# Patient Record
Sex: FEMALE | Race: BLACK OR AFRICAN AMERICAN | NOT HISPANIC OR LATINO
[De-identification: names, ages, dates, MRNs, and addresses within clinical notes are randomized per-mention and may not be internally consistent; named-entity substitution may affect disease eponyms.]

---

## 2023-06-08 PROBLEM — Z00.00 ENCOUNTER FOR PREVENTIVE HEALTH EXAMINATION: Status: ACTIVE | Noted: 2023-06-08

## 2023-06-13 ENCOUNTER — NON-APPOINTMENT (OUTPATIENT)
Age: 38
End: 2023-06-13

## 2023-06-13 ENCOUNTER — APPOINTMENT (OUTPATIENT)
Dept: OBGYN | Facility: CLINIC | Age: 38
End: 2023-06-13
Payer: MEDICAID

## 2023-06-13 VITALS
DIASTOLIC BLOOD PRESSURE: 70 MMHG | WEIGHT: 143 LBS | BODY MASS INDEX: 23.82 KG/M2 | HEIGHT: 65 IN | SYSTOLIC BLOOD PRESSURE: 118 MMHG

## 2023-06-13 PROCEDURE — 0500F INITIAL PRENATAL CARE VISIT: CPT

## 2023-06-13 RX ORDER — ONDANSETRON 8 MG/1
8 TABLET, ORALLY DISINTEGRATING ORAL EVERY 8 HOURS
Qty: 1 | Refills: 6 | Status: ACTIVE | COMMUNITY
Start: 2023-06-13 | End: 1900-01-01

## 2023-06-13 RX ORDER — TERCONAZOLE 80 MG/1
80 SUPPOSITORY VAGINAL
Qty: 3 | Refills: 0 | Status: COMPLETED | COMMUNITY
Start: 2023-06-13

## 2023-06-14 LAB
ABO + RH PNL BLD: NORMAL
ALBUMIN SERPL ELPH-MCNC: 4.8 G/DL
ALP BLD-CCNC: 52 U/L
ALT SERPL-CCNC: 14 U/L
ANION GAP SERPL CALC-SCNC: 13 MMOL/L
AST SERPL-CCNC: 29 U/L
BILIRUB SERPL-MCNC: 0.4 MG/DL
BLD GP AB SCN SERPL QL: NORMAL
BUN SERPL-MCNC: 8 MG/DL
CALCIUM SERPL-MCNC: 10.5 MG/DL
CANDIDA VAG CYTO: DETECTED
CHLORIDE SERPL-SCNC: 101 MMOL/L
CO2 SERPL-SCNC: 21 MMOL/L
CREAT SERPL-MCNC: 0.6 MG/DL
EGFR: 117 ML/MIN/1.73M2
ESTIMATED AVERAGE GLUCOSE: 105 MG/DL
G VAGINALIS+PREV SP MTYP VAG QL MICRO: NOT DETECTED
GLUCOSE SERPL-MCNC: 89 MG/DL
HBA1C MFR BLD HPLC: 5.3 %
HBV SURFACE AG SER QL: NONREACTIVE
HCV AB SER QL: NONREACTIVE
HCV S/CO RATIO: 0.15 S/CO
HGB A MFR BLD: 97.1 %
HGB A2 MFR BLD: 2.9 %
HGB FRACT BLD-IMP: NORMAL
POTASSIUM SERPL-SCNC: 4.2 MMOL/L
PROT SERPL-MCNC: 7.8 G/DL
SODIUM SERPL-SCNC: 136 MMOL/L
T VAGINALIS VAG QL WET PREP: NOT DETECTED
TSH SERPL-ACNC: 1.67 UIU/ML

## 2023-06-14 RX ORDER — MICONAZOLE NITRATE 20 MG/G
2 CREAM VAGINAL
Qty: 1 | Refills: 0 | Status: ACTIVE | COMMUNITY
Start: 2023-06-14 | End: 1900-01-01

## 2023-06-15 LAB
BACTERIA UR CULT: NORMAL
C TRACH RRNA SPEC QL NAA+PROBE: NOT DETECTED
CMV IGG SERPL QL: 6.3 U/ML
CMV IGG SERPL-IMP: POSITIVE
CMV IGM SERPL QL: <8 AU/ML
CMV IGM SERPL QL: NEGATIVE
HIV1+2 AB SPEC QL IA.RAPID: NONREACTIVE
LEAD BLD-MCNC: <1 UG/DL
MEV IGG FLD QL IA: >300 AU/ML
MEV IGG+IGM SER-IMP: POSITIVE
MUV AB SER-ACNC: POSITIVE
MUV IGG SER QL IA: 117 AU/ML
N GONORRHOEA RRNA SPEC QL NAA+PROBE: NOT DETECTED
RUBV IGG FLD-ACNC: 24.4 INDEX
RUBV IGG SER-IMP: POSITIVE
SOURCE AMPLIFICATION: NORMAL
T GONDII AB SER-IMP: NEGATIVE
T GONDII AB SER-IMP: NEGATIVE
T GONDII IGG SER QL: <3 IU/ML
T GONDII IGM SER QL: <3 AU/ML
T PALLIDUM AB SER QL IA: NEGATIVE
VZV AB TITR SER: POSITIVE
VZV IGG SER IF-ACNC: 487 INDEX

## 2023-06-19 LAB
B19V IGG SER QL IA: 6.85 INDEX
B19V IGG+IGM SER-IMP: NORMAL
B19V IGG+IGM SER-IMP: POSITIVE
B19V IGM FLD-ACNC: 0.3 INDEX
B19V IGM SER-ACNC: NEGATIVE
FMR1 GENE MUT ANL BLD/T: NORMAL

## 2023-06-20 LAB
AR GENE MUT ANL BLD/T: NORMAL
CFTR MUT TESTED BLD/T: NEGATIVE

## 2023-06-20 NOTE — HISTORY OF PRESENT ILLNESS
[Patient reported mammogram was normal] : Patient reported mammogram was normal [Patient reported PAP Smear was normal] : Patient reported PAP Smear was normal [N] : Patient does not use contraception [Monogamous (Male Partner)] : is monogamous with a male partner [Y] : Positive pregnancy history [Normal Amount/Duration] :  normal amount and duration [Regular Cycle Intervals] : periods have been regular [Currently Active] : currently active [Men] : men [No] : No [Mammogramdate] : 2023 [TextBox_19] : Patient reports normal [PapSmeardate] : 2022 [TextBox_31] : Patient reports normal [LMPDate] : 4/9/23 [MensesFreq] : 28 [MensesLength] : 4-5 [PGHxTotal] : 2 [PGHxFullTerm] : 0 [PGHxPremature] : 0 [PGHxAbortions] : 1 [[facility] ServicesxLiving] : 0 [PGHxABInduced] : 0 [PGHxABSpont] : 0 [PGHxEctopic] : 0 [PGHxMultBirths] : 0 [FreeTextEntry1] : 4/9/23

## 2023-06-20 NOTE — COUNSELING
[Nutrition/ Exercise/ Weight Management] : nutrition, exercise, weight management [Vitamins/Supplements] : vitamins/supplements [Breast Self Exam] : breast self exam [Vaccines] : vaccines [FreeTextEntry2] : diet restrictions

## 2023-06-20 NOTE — END OF VISIT
[] : Resident [FreeTextEntry3] : DIETARY CHANGES, EXERCISE MODIFICATIONS, PNV W/ DHA RECOMMENDED, TRAVEL RESTRICTIONS,COVID VACCINE DISCUSSED\par PRENATAL LABS\par URINE CULTURE\par VAGINAL CULTURES COLLECTED\par REFERRAL FOR MFM US PONCHO\par \par

## 2023-06-28 ENCOUNTER — ASOB RESULT (OUTPATIENT)
Age: 38
End: 2023-06-28

## 2023-06-28 ENCOUNTER — APPOINTMENT (OUTPATIENT)
Dept: ANTEPARTUM | Facility: CLINIC | Age: 38
End: 2023-06-28
Payer: MEDICAID

## 2023-06-28 PROCEDURE — 93976 VASCULAR STUDY: CPT

## 2023-06-28 PROCEDURE — 76801 OB US < 14 WKS SINGLE FETUS: CPT

## 2023-06-28 PROCEDURE — 76813 OB US NUCHAL MEAS 1 GEST: CPT

## 2023-07-03 ENCOUNTER — NON-APPOINTMENT (OUTPATIENT)
Age: 38
End: 2023-07-03

## 2023-07-10 ENCOUNTER — NON-APPOINTMENT (OUTPATIENT)
Age: 38
End: 2023-07-10

## 2023-07-10 ENCOUNTER — APPOINTMENT (OUTPATIENT)
Dept: OBGYN | Facility: CLINIC | Age: 38
End: 2023-07-10
Payer: MEDICAID

## 2023-07-10 VITALS
WEIGHT: 141 LBS | DIASTOLIC BLOOD PRESSURE: 72 MMHG | BODY MASS INDEX: 23.49 KG/M2 | SYSTOLIC BLOOD PRESSURE: 120 MMHG | HEIGHT: 65 IN | OXYGEN SATURATION: 100 %

## 2023-07-10 PROCEDURE — 0502F SUBSEQUENT PRENATAL CARE: CPT

## 2023-07-11 ENCOUNTER — NON-APPOINTMENT (OUTPATIENT)
Age: 38
End: 2023-07-11

## 2023-07-31 ENCOUNTER — APPOINTMENT (OUTPATIENT)
Dept: ANTEPARTUM | Facility: CLINIC | Age: 38
End: 2023-07-31
Payer: MEDICAID

## 2023-07-31 ENCOUNTER — ASOB RESULT (OUTPATIENT)
Age: 38
End: 2023-07-31

## 2023-07-31 PROCEDURE — 76805 OB US >/= 14 WKS SNGL FETUS: CPT

## 2023-08-01 ENCOUNTER — RX RENEWAL (OUTPATIENT)
Age: 38
End: 2023-08-01

## 2023-08-02 ENCOUNTER — NON-APPOINTMENT (OUTPATIENT)
Age: 38
End: 2023-08-02

## 2023-08-07 ENCOUNTER — NON-APPOINTMENT (OUTPATIENT)
Age: 38
End: 2023-08-07

## 2023-08-11 ENCOUNTER — APPOINTMENT (OUTPATIENT)
Dept: OBGYN | Facility: CLINIC | Age: 38
End: 2023-08-11
Payer: MEDICAID

## 2023-08-11 VITALS
WEIGHT: 150 LBS | SYSTOLIC BLOOD PRESSURE: 120 MMHG | DIASTOLIC BLOOD PRESSURE: 70 MMHG | BODY MASS INDEX: 24.96 KG/M2 | OXYGEN SATURATION: 100 %

## 2023-08-11 DIAGNOSIS — R00.0 TACHYCARDIA, UNSPECIFIED: ICD-10-CM

## 2023-08-11 PROCEDURE — 0502F SUBSEQUENT PRENATAL CARE: CPT

## 2023-08-11 PROCEDURE — 81003 URINALYSIS AUTO W/O SCOPE: CPT | Mod: NC,QW

## 2023-08-14 LAB
HCT VFR BLD CALC: 33.8 %
HGB BLD-MCNC: 10.9 G/DL
MCHC RBC-ENTMCNC: 31.6 PG
MCHC RBC-ENTMCNC: 32.2 GM/DL
MCV RBC AUTO: 98 FL
PLATELET # BLD AUTO: 270 K/UL
RBC # BLD: 3.45 M/UL
RBC # FLD: 15.9 %
WBC # FLD AUTO: 9.44 K/UL

## 2023-08-17 LAB
AFP MOM: 1.1
AFP VALUE: 55.5 NG/ML
ALPHA FETOPROTEIN SERUM COMMENT: NORMAL
ALPHA FETOPROTEIN SERUM INTERPRETATION: NORMAL
ALPHA FETOPROTEIN SERUM RESULTS: NORMAL
ALPHA FETOPROTEIN SERUM TEST RESULTS: NORMAL
GESTATIONAL AGE BASED ON: NORMAL
GESTATIONAL AGE ON COLLECTION DATE: 18.9 WEEKS
INSULIN DEP DIABETES: NO
MATERNAL AGE AT EDD AFP: 39.9 YR
MULTIPLE GESTATION: NO
OSBR RISK 1 IN: 8933
RACE: NORMAL
WEIGHT AFP: 150 LBS

## 2023-08-24 ENCOUNTER — EMERGENCY (EMERGENCY)
Facility: HOSPITAL | Age: 38
LOS: 1 days | Discharge: HOME | End: 2023-08-24
Attending: STUDENT IN AN ORGANIZED HEALTH CARE EDUCATION/TRAINING PROGRAM | Admitting: OBSTETRICS & GYNECOLOGY
Payer: COMMERCIAL

## 2023-08-24 VITALS
SYSTOLIC BLOOD PRESSURE: 127 MMHG | RESPIRATION RATE: 18 BRPM | HEART RATE: 124 BPM | OXYGEN SATURATION: 98 % | TEMPERATURE: 98 F | DIASTOLIC BLOOD PRESSURE: 80 MMHG

## 2023-08-24 VITALS — HEART RATE: 116 BPM

## 2023-08-24 DIAGNOSIS — O99.891 OTHER SPECIFIED DISEASES AND CONDITIONS COMPLICATING PREGNANCY: ICD-10-CM

## 2023-08-24 DIAGNOSIS — Z3A.20 20 WEEKS GESTATION OF PREGNANCY: ICD-10-CM

## 2023-08-24 DIAGNOSIS — R10.32 LEFT LOWER QUADRANT PAIN: ICD-10-CM

## 2023-08-24 DIAGNOSIS — R00.0 TACHYCARDIA, UNSPECIFIED: ICD-10-CM

## 2023-08-24 LAB
ALBUMIN SERPL ELPH-MCNC: 3.8 G/DL — SIGNIFICANT CHANGE UP (ref 3.3–5)
ALP SERPL-CCNC: 78 U/L — SIGNIFICANT CHANGE UP (ref 40–120)
ALT FLD-CCNC: 25 U/L — SIGNIFICANT CHANGE UP (ref 10–45)
ANION GAP SERPL CALC-SCNC: 8 MMOL/L — SIGNIFICANT CHANGE UP (ref 5–17)
APPEARANCE UR: CLEAR — SIGNIFICANT CHANGE UP
AST SERPL-CCNC: 35 U/L — SIGNIFICANT CHANGE UP (ref 10–40)
BILIRUB SERPL-MCNC: 0.7 MG/DL — SIGNIFICANT CHANGE UP (ref 0.2–1.2)
BILIRUB UR-MCNC: NEGATIVE — SIGNIFICANT CHANGE UP
BUN SERPL-MCNC: 6 MG/DL — LOW (ref 7–23)
CALCIUM SERPL-MCNC: 9.8 MG/DL — SIGNIFICANT CHANGE UP (ref 8.4–10.5)
CHLORIDE SERPL-SCNC: 101 MMOL/L — SIGNIFICANT CHANGE UP (ref 96–108)
CO2 SERPL-SCNC: 24 MMOL/L — SIGNIFICANT CHANGE UP (ref 22–31)
COLOR SPEC: YELLOW — SIGNIFICANT CHANGE UP
CREAT SERPL-MCNC: 0.71 MG/DL — SIGNIFICANT CHANGE UP (ref 0.5–1.3)
DIFF PNL FLD: NEGATIVE — SIGNIFICANT CHANGE UP
EGFR: 111 ML/MIN/1.73M2 — SIGNIFICANT CHANGE UP
GLUCOSE SERPL-MCNC: 133 MG/DL — HIGH (ref 70–99)
GLUCOSE UR QL: 100
HCT VFR BLD CALC: 35.7 % — SIGNIFICANT CHANGE UP (ref 34.5–45)
HGB BLD-MCNC: 12.3 G/DL — SIGNIFICANT CHANGE UP (ref 11.5–15.5)
KETONES UR-MCNC: NEGATIVE — SIGNIFICANT CHANGE UP
LEUKOCYTE ESTERASE UR-ACNC: NEGATIVE — SIGNIFICANT CHANGE UP
MCHC RBC-ENTMCNC: 32.9 PG — SIGNIFICANT CHANGE UP (ref 27–34)
MCHC RBC-ENTMCNC: 34.5 GM/DL — SIGNIFICANT CHANGE UP (ref 32–36)
MCV RBC AUTO: 95.5 FL — SIGNIFICANT CHANGE UP (ref 80–100)
NITRITE UR-MCNC: NEGATIVE — SIGNIFICANT CHANGE UP
NRBC # BLD: 0 /100 WBCS — SIGNIFICANT CHANGE UP (ref 0–0)
PH UR: 7 — SIGNIFICANT CHANGE UP (ref 5–8)
PLATELET # BLD AUTO: 254 K/UL — SIGNIFICANT CHANGE UP (ref 150–400)
POTASSIUM SERPL-MCNC: 3.3 MMOL/L — LOW (ref 3.5–5.3)
POTASSIUM SERPL-SCNC: 3.3 MMOL/L — LOW (ref 3.5–5.3)
PROT SERPL-MCNC: 7.4 G/DL — SIGNIFICANT CHANGE UP (ref 6–8.3)
PROT UR-MCNC: NEGATIVE MG/DL — SIGNIFICANT CHANGE UP
RBC # BLD: 3.74 M/UL — LOW (ref 3.8–5.2)
RBC # FLD: 14.3 % — SIGNIFICANT CHANGE UP (ref 10.3–14.5)
SODIUM SERPL-SCNC: 133 MMOL/L — LOW (ref 135–145)
SP GR SPEC: <=1.005 — SIGNIFICANT CHANGE UP (ref 1–1.03)
UROBILINOGEN FLD QL: 0.2 E.U./DL — SIGNIFICANT CHANGE UP
WBC # BLD: 11.56 K/UL — HIGH (ref 3.8–10.5)
WBC # FLD AUTO: 11.56 K/UL — HIGH (ref 3.8–10.5)

## 2023-08-24 PROCEDURE — 80053 COMPREHEN METABOLIC PANEL: CPT

## 2023-08-24 PROCEDURE — 81003 URINALYSIS AUTO W/O SCOPE: CPT

## 2023-08-24 PROCEDURE — 36415 COLL VENOUS BLD VENIPUNCTURE: CPT

## 2023-08-24 PROCEDURE — 99285 EMERGENCY DEPT VISIT HI MDM: CPT

## 2023-08-24 PROCEDURE — 85027 COMPLETE CBC AUTOMATED: CPT

## 2023-08-24 RX ORDER — SODIUM CHLORIDE 9 MG/ML
1000 INJECTION, SOLUTION INTRAVENOUS ONCE
Refills: 0 | Status: COMPLETED | OUTPATIENT
Start: 2023-08-24 | End: 2023-08-24

## 2023-08-24 RX ORDER — ACETAMINOPHEN 500 MG
650 TABLET ORAL ONCE
Refills: 0 | Status: COMPLETED | OUTPATIENT
Start: 2023-08-24 | End: 2023-08-24

## 2023-08-24 RX ADMIN — SODIUM CHLORIDE 1000 MILLILITER(S): 9 INJECTION, SOLUTION INTRAVENOUS at 20:26

## 2023-08-24 RX ADMIN — Medication 650 MILLIGRAM(S): at 20:55

## 2023-08-24 NOTE — ED PROVIDER NOTE - CLINICAL SUMMARY MEDICAL DECISION MAKING FREE TEXT BOX
Pt has LLQ abd and L flank pain, 2/2 likely pyelonephritis in pregnancy.  Low suspicion for atypical appendicitis, renal stone, genital torsion, acute cholecystitis, AAA, aortic dissection, serious bacterial illness or other emergent intraabdominal pathology.  Afebrile in ED, no s/s of sepsis  R/o infection with UA  Pt and provider decided not to obtain imaging in ED. Will transfer pt upstairs to L&D for further evaluation upon completion of ED w/u and treatment.  Labs, pain control, IVF, Abx Pt has LLQ abd and L flank pain, 2/2 possible pyelonephritis in pregnancy vs normal pregnancy pain vs other complication related to pregnancy.  Low suspicion for atypical appendicitis, renal stone, genital torsion, acute cholecystitis, AAA, aortic dissection, serious bacterial illness or other emergent intraabdominal pathology.  Afebrile in ED, no s/s of sepsis  R/o infection with UA  Pt and provider decided not to obtain imaging in ED. Will transfer pt upstairs to L&D for further evaluation upon completion of ED w/u and treatment.  Labs, pain control, IVF, +/- Abx pending UA Pt has LLQ abd and L flank pain, 2/2 possible pyelonephritis in pregnancy vs normal pregnancy pain vs other complication related to pregnancy.  Low suspicion for atypical appendicitis, renal stone, genital torsion, acute cholecystitis, AAA, aortic dissection, serious bacterial illness or other emergent intraabdominal pathology.  Afebrile in ED, Low suspicion of sepsis   EKG shows sinus tachycardia, patient visibly anxious. HR improving with IV fluids.  R/o infection with UA  Pt and provider decided not to obtain imaging in ED. Will transfer pt upstairs to L&D for further evaluation upon completion of ED w/u and treatment.  Labs, pain control, IVF, +/- Abx pending UA

## 2023-08-24 NOTE — ED PROVIDER NOTE - PROGRESS NOTE DETAILS
UA neg for infection. Mild leukocytosis noted (WNL for pregnancy). Tachycardia improving w IVF bolus. Low susp of emergent etiology of pain. Will send to L&D for further evaluation.

## 2023-08-24 NOTE — ED PROVIDER NOTE - NS ED ROS FT
CONSTITUTIONAL: No fever, no chills, no fatigue  EYES: No eye redness, no visual changes  ENT: No ear pain, no sore throat  CARDIOVASCULAR: No chest pain, no palpitations  RESPIRATORY: No cough, no SOB  GI: + abdominal pain, no nausea, no vomiting, no constipation, no diarrhea  GENITOURINARY: No dysuria, no hematuria, + flank pain  MUSCULOSKELETAL: No joint pain, no myalgias  SKIN: No rash, no peripheral edema  NEURO: No headache, no confusion    ALL OTHER SYSTEMS NEGATIVE.

## 2023-08-24 NOTE — ED PROVIDER NOTE - PHYSICAL EXAMINATION
CONSTITUTIONAL: Non-toxic; in no apparent distress  HEAD: Normocephalic; atraumatic  EYES: PERRL; EOM intact   ENMT: External appears normal  NECK: Supple; non-tender  CARD: Tachycardic; no murmurs, rubs, or gallops  RESP: Normal chest excursion with respiration; breath sounds clear and equal bilaterally  ABD: Soft, non-distended; non-tender  EXT: Normal ROM in all four extremities; non-tender to palpation  SKIN: Warm, dry, no rash  NEURO:  No focal neurological deficiencies.

## 2023-08-24 NOTE — ED ADULT NURSE NOTE - NSFALLUNIVINTERV_ED_ALL_ED
Bed/Stretcher in lowest position, wheels locked, appropriate side rails in place/Call bell, personal items and telephone in reach/Instruct patient to call for assistance before getting out of bed/chair/stretcher/Non-slip footwear applied when patient is off stretcher/[place] to call system/Physically safe environment - no spills, clutter or unnecessary equipment/Purposeful proactive rounding/Room/bathroom lighting operational, light cord in reach Bed/Stretcher in lowest position, wheels locked, appropriate side rails in place/Call bell, personal items and telephone in reach/Instruct patient to call for assistance before getting out of bed/chair/stretcher/Non-slip footwear applied when patient is off stretcher/[place] to call system/Physically safe environment - no spills, clutter or unnecessary equipment/Purposeful proactive rounding/Room/bathroom lighting operational, light cord in reach Bed/Stretcher in lowest position, wheels locked, appropriate side rails in place/Call bell, personal items and telephone in reach/Instruct patient to call for assistance before getting out of bed/chair/stretcher/Non-slip footwear applied when patient is off stretcher/[place] to call system/Physically safe environment - no spills, clutter or unnecessary equipment/Purposeful proactive rounding/Room/bathroom lighting operational, light cord in reach

## 2023-08-24 NOTE — ED PROVIDER NOTE - NSFOLLOWUPINSTRUCTIONS_ED_ALL_ED_FT
Follow up with your primary medical doctor as soon as possible.    Return to the emergency department if your symptoms worsen or if you develop new symptoms.  If you have any problems with followup, please call the ED Referral Coordinator at [phone].    Pyelonephritis    Pyelonephritis is a kidney infection. In most cases, the infection clears up with treatment and does not cause further problems. More severe infections or chronic infections can sometimes spread to the bloodstream or lead to other problems with the kidneys. Symptoms include frequent or painful urination, abdominal pain, back pain, flank pain, fever/chills, nausea, or vomiting. If you were prescribed an antibiotic medicine, take it as told by your health care provider. Do not stop taking the antibiotic even if you start to feel better.    SEEK IMMEDIATE MEDICAL CARE IF YOU HAVE ANY OF THE FOLLOWING SYMPTOMS: inability to hold down antibiotics or fluids, worsening pain, dizziness/lightheadedness, or change in mental status. Follow up with your primary medical doctor as soon as possible.    Return to the emergency department if your symptoms worsen or if you develop new symptoms.  If you have any problems with followup, please call the ED Referral Coordinator at [phone].    Pyelonephritis    Pyelonephritis is a kidney infection. In most cases, the infection clears up with treatment and does not cause further problems. More severe infections or chronic infections can sometimes spread to the bloodstream or lead to other problems with the kidneys. Symptoms include frequent or painful urination, abdominal pain, back pain, flank pain, fever/chills, nausea, or vomiting. If you were prescribed an antibiotic medicine, take it as told by your health care provider. Do not stop taking the antibiotic even if you start to feel better.    SEEK IMMEDIATE MEDICAL CARE IF YOU HAVE ANY OF THE FOLLOWING SYMPTOMS: inability to hold down antibiotics or fluids, worsening pain, dizziness/lightheadedness, or change in mental status. Follow up with your primary medical doctor as soon as possible.    Return to the emergency department if your symptoms worsen or if you develop new symptoms.  If you have any problems with followup, please call the ED Referral Coordinator at [phone].    Pyelonephritis    Pyelonephritis is a kidney infection. In most cases, the infection clears up with treatment and does not cause further problems. More severe infections or chronic infections can sometimes spread to the bloodstream or lead to other problems with the kidneys. Symptoms include frequent or painful urination, abdominal pain, back pain, flank pain, fever/chills, nausea, or vomiting. If you were prescribed an antibiotic medicine, take it as told by your health care provider. Do not stop taking the antibiotic even if you start to feel better.    SEEK IMMEDIATE MEDICAL CARE IF YOU HAVE ANY OF THE FOLLOWING SYMPTOMS: inability to hold down antibiotics or fluids, worsening pain, dizziness/lightheadedness, or change in mental status.

## 2023-08-24 NOTE — ED PROVIDER NOTE - OBJECTIVE STATEMENT
39 yo F , 20 wks pregnant, w no PMH, p/w LLQ abd pain since yesterday. She states she got up to urinate multiple times overnight, during which she noticed pain more. She also has mild L-sided flank pain for 1-2 days. No dysuria, vaginal dc or bleeding, n/v, diarrhea, or constipation. No SOB, CP, leg swelling, cough, hemoptysis, or recent travel. 37 yo F , 20 wks pregnant, w no PMH, p/w LLQ abd pain since yesterday. She states she got up to urinate multiple times overnight, during which she noticed pain more. She also has mild L-sided flank pain for 1-2 days. No dysuria, vaginal dc or bleeding, n/v, diarrhea, or constipation. No SOB, CP, leg swelling, cough, hemoptysis, or recent travel.

## 2023-08-24 NOTE — ED ADULT TRIAGE NOTE - CHIEF COMPLAINT QUOTE
Pt presents to ED C/O LLQ abd pain starting last night. Pt 20 weeks pregnant . States, " I feel when I move around the pain moves". Denies bleeding/ discharge, N/V/D.

## 2023-08-29 ENCOUNTER — ASOB RESULT (OUTPATIENT)
Age: 38
End: 2023-08-29

## 2023-08-29 ENCOUNTER — APPOINTMENT (OUTPATIENT)
Dept: ANTEPARTUM | Facility: CLINIC | Age: 38
End: 2023-08-29
Payer: MEDICAID

## 2023-08-29 PROCEDURE — 76811 OB US DETAILED SNGL FETUS: CPT

## 2023-09-01 ENCOUNTER — NON-APPOINTMENT (OUTPATIENT)
Age: 38
End: 2023-09-01

## 2023-09-08 ENCOUNTER — NON-APPOINTMENT (OUTPATIENT)
Age: 38
End: 2023-09-08

## 2023-09-08 ENCOUNTER — APPOINTMENT (OUTPATIENT)
Dept: OBGYN | Facility: CLINIC | Age: 38
End: 2023-09-08
Payer: MEDICAID

## 2023-09-08 VITALS
WEIGHT: 150 LBS | SYSTOLIC BLOOD PRESSURE: 120 MMHG | BODY MASS INDEX: 24.96 KG/M2 | OXYGEN SATURATION: 100 % | DIASTOLIC BLOOD PRESSURE: 80 MMHG

## 2023-09-08 DIAGNOSIS — N89.8 OTHER SPECIFIED NONINFLAMMATORY DISORDERS OF VAGINA: ICD-10-CM

## 2023-09-08 PROCEDURE — 0502F SUBSEQUENT PRENATAL CARE: CPT

## 2023-09-12 ENCOUNTER — ASOB RESULT (OUTPATIENT)
Age: 38
End: 2023-09-12

## 2023-09-12 ENCOUNTER — APPOINTMENT (OUTPATIENT)
Dept: ANTEPARTUM | Facility: CLINIC | Age: 38
End: 2023-09-12
Payer: MEDICAID

## 2023-09-12 PROCEDURE — 76816 OB US FOLLOW-UP PER FETUS: CPT

## 2023-09-15 ENCOUNTER — NON-APPOINTMENT (OUTPATIENT)
Age: 38
End: 2023-09-15

## 2023-09-15 DIAGNOSIS — B37.31 ACUTE CANDIDIASIS OF VULVA AND VAGINA: ICD-10-CM

## 2023-09-15 LAB
A VAGINAE DNA VAG QL NAA+PROBE: NORMAL
BVAB2 DNA VAG QL NAA+PROBE: NORMAL
C KRUSEI DNA VAG QL NAA+PROBE: NEGATIVE
C KRUSEI DNA VAG QL NAA+PROBE: POSITIVE
C TRACH RRNA SPEC QL NAA+PROBE: NEGATIVE
CANDIDA DNA VAG QL NAA+PROBE: NEGATIVE
MEGA1 DNA VAG QL NAA+PROBE: NORMAL
N GONORRHOEA RRNA SPEC QL NAA+PROBE: NEGATIVE
T VAGINALIS RRNA SPEC QL NAA+PROBE: NEGATIVE

## 2023-09-15 RX ORDER — TERCONAZOLE 4 MG/G
0.4 CREAM VAGINAL
Qty: 1 | Refills: 0 | Status: ACTIVE | COMMUNITY
Start: 2023-09-15 | End: 1900-01-01

## 2023-10-03 ENCOUNTER — NON-APPOINTMENT (OUTPATIENT)
Age: 38
End: 2023-10-03

## 2023-10-06 ENCOUNTER — APPOINTMENT (OUTPATIENT)
Dept: OBGYN | Facility: CLINIC | Age: 38
End: 2023-10-06
Payer: MEDICAID

## 2023-10-06 VITALS
OXYGEN SATURATION: 100 % | SYSTOLIC BLOOD PRESSURE: 100 MMHG | DIASTOLIC BLOOD PRESSURE: 70 MMHG | BODY MASS INDEX: 25.13 KG/M2 | WEIGHT: 151 LBS

## 2023-10-06 PROCEDURE — 0502F SUBSEQUENT PRENATAL CARE: CPT

## 2023-10-06 PROCEDURE — 81003 URINALYSIS AUTO W/O SCOPE: CPT | Mod: QW

## 2023-10-06 RX ORDER — FOLIC ACID/MULTIVIT,IRON,MINER 0.4MG-18MG
200 TABLET ORAL
Qty: 90 | Refills: 3 | Status: DISCONTINUED | COMMUNITY
Start: 2023-08-14 | End: 2023-10-06

## 2023-10-06 RX ORDER — PSEUDOEPHEDRINE HCL 30 MG
27-0.8 TABLET ORAL DAILY
Qty: 90 | Refills: 0 | Status: ACTIVE | COMMUNITY
Start: 2023-10-06 | End: 1900-01-01

## 2023-10-09 LAB
BACTERIA UR CULT: NORMAL
GLUCOSE 1H P 50 G GLC PO SERPL-MCNC: 97 MG/DL
HCT VFR BLD CALC: 35.1 %
HGB BLD-MCNC: 11 G/DL
MCHC RBC-ENTMCNC: 31.3 GM/DL
MCHC RBC-ENTMCNC: 32.7 PG
MCV RBC AUTO: 104.5 FL
PLATELET # BLD AUTO: 254 K/UL
RBC # BLD: 3.36 M/UL
RBC # FLD: 14.2 %
T PALLIDUM AB SER QL IA: NEGATIVE
WBC # FLD AUTO: 9.4 K/UL

## 2023-10-17 ENCOUNTER — EMERGENCY (EMERGENCY)
Facility: HOSPITAL | Age: 38
LOS: 1 days | Discharge: HOME | End: 2023-10-17
Attending: STUDENT IN AN ORGANIZED HEALTH CARE EDUCATION/TRAINING PROGRAM | Admitting: OBSTETRICS & GYNECOLOGY
Payer: COMMERCIAL

## 2023-10-17 VITALS
DIASTOLIC BLOOD PRESSURE: 69 MMHG | TEMPERATURE: 98 F | HEART RATE: 111 BPM | SYSTOLIC BLOOD PRESSURE: 117 MMHG | RESPIRATION RATE: 18 BRPM | WEIGHT: 152.12 LBS | HEIGHT: 66 IN | OXYGEN SATURATION: 100 %

## 2023-10-17 LAB
APPEARANCE UR: CLEAR — SIGNIFICANT CHANGE UP
BACTERIA # UR AUTO: PRESENT /HPF
BILIRUB UR-MCNC: NEGATIVE — SIGNIFICANT CHANGE UP
COLOR SPEC: YELLOW — SIGNIFICANT CHANGE UP
COMMENT - URINE: SIGNIFICANT CHANGE UP
DIFF PNL FLD: NEGATIVE — SIGNIFICANT CHANGE UP
EPI CELLS # UR: ABNORMAL /HPF (ref 0–5)
GLUCOSE UR QL: NEGATIVE — SIGNIFICANT CHANGE UP
KETONES UR-MCNC: NEGATIVE — SIGNIFICANT CHANGE UP
LEUKOCYTE ESTERASE UR-ACNC: ABNORMAL
NITRITE UR-MCNC: NEGATIVE — SIGNIFICANT CHANGE UP
PH UR: 7 — SIGNIFICANT CHANGE UP (ref 5–8)
PROT UR-MCNC: NEGATIVE MG/DL — SIGNIFICANT CHANGE UP
RBC CASTS # UR COMP ASSIST: < 5 /HPF — SIGNIFICANT CHANGE UP
SP GR SPEC: 1.01 — SIGNIFICANT CHANGE UP (ref 1–1.03)
UROBILINOGEN FLD QL: 0.2 E.U./DL — SIGNIFICANT CHANGE UP
WBC UR QL: < 5 /HPF — SIGNIFICANT CHANGE UP

## 2023-10-17 PROCEDURE — 81001 URINALYSIS AUTO W/SCOPE: CPT

## 2023-10-17 PROCEDURE — 99285 EMERGENCY DEPT VISIT HI MDM: CPT

## 2023-10-17 PROCEDURE — 87086 URINE CULTURE/COLONY COUNT: CPT

## 2023-10-17 NOTE — ED PROVIDER NOTE - OBJECTIVE STATEMENT
38-year-old  female at 23 weeks presenting with lower abdominal pain x1 day.  States having onset last night, persistent since, reports some associated constipation but otherwise denies fevers, chills, chest pain, shortness of breath, nausea vomiting or diarrhea. Denies loss of fetal movement, leakage of fluid, vaginal bleeding.

## 2023-10-17 NOTE — ED PROVIDER NOTE - PHYSICAL EXAMINATION
Gen - NAD; well-appearing; A+Ox3   HEENT - NCAT, EOMI  Neck - supple  Resp - CTAB, no increased WOB  CV -  RRR, no m/r/g  Abd - gravid uterus, soft, NT, ND; no guarding or rebound  MSK - FROM of b/l UE and LE, no gross deformities  Extrem - no LE edema/erythema/tenderness  Neuro - no focal motor or sensation deficits  Skin - warm, well perfused

## 2023-10-17 NOTE — ED ADULT TRIAGE NOTE - AS HEIGHT TYPE
Addended by: KANDI POOLE on: 5/7/2019 08:31 AM     Modules accepted: Abdullahi    
Addended by: KANDI POOLE on: 5/7/2019 08:35 AM     Modules accepted: Abdullahi    
Addended by: TRAV VILLARREAL on: 5/7/2019 11:23 AM     Modules accepted: Orders    
stated

## 2023-10-17 NOTE — ED PROVIDER NOTE - CLINICAL SUMMARY MEDICAL DECISION MAKING FREE TEXT BOX
37 yo  female @ ~23 wga presenting with lower abdominal pain x 1d, overall well appearing here, slightly tachycardic but otherwise vitals wnl, exam is unremarkable. Patient without actionable emergent condition requiring ED stabilization, feel will benefit from transfer to L&D for further evaluation given possible viable pregnancy with potential OB related complaint, discussed with L&D provider for sign out. 39 yo  female @ ~23 wga presenting with lower abdominal pain x 1d, overall well appearing here, slightly tachycardic but otherwise vitals wnl, exam is unremarkable. Patient without actionable emergent condition requiring ED stabilization, feel will benefit from transfer to L&D for further evaluation given possible viable pregnancy with potential OB related complaint, discussed with L&D provider for sign out.

## 2023-10-19 LAB
CULTURE RESULTS: NO GROWTH — SIGNIFICANT CHANGE UP
SPECIMEN SOURCE: SIGNIFICANT CHANGE UP

## 2023-10-20 DIAGNOSIS — O99.891 OTHER SPECIFIED DISEASES AND CONDITIONS COMPLICATING PREGNANCY: ICD-10-CM

## 2023-10-20 DIAGNOSIS — R10.30 LOWER ABDOMINAL PAIN, UNSPECIFIED: ICD-10-CM

## 2023-10-20 DIAGNOSIS — K59.00 CONSTIPATION, UNSPECIFIED: ICD-10-CM

## 2023-10-20 DIAGNOSIS — Z3A.23 23 WEEKS GESTATION OF PREGNANCY: ICD-10-CM

## 2023-10-20 DIAGNOSIS — O99.612 DISEASES OF THE DIGESTIVE SYSTEM COMPLICATING PREGNANCY, SECOND TRIMESTER: ICD-10-CM

## 2023-10-23 ENCOUNTER — ASOB RESULT (OUTPATIENT)
Age: 38
End: 2023-10-23

## 2023-10-23 ENCOUNTER — APPOINTMENT (OUTPATIENT)
Dept: ANTEPARTUM | Facility: CLINIC | Age: 38
End: 2023-10-23
Payer: MEDICAID

## 2023-10-23 ENCOUNTER — NON-APPOINTMENT (OUTPATIENT)
Age: 38
End: 2023-10-23

## 2023-10-23 PROCEDURE — 76818 FETAL BIOPHYS PROFILE W/NST: CPT

## 2023-10-23 PROCEDURE — 76820 UMBILICAL ARTERY ECHO: CPT | Mod: 59

## 2023-10-23 PROCEDURE — 76816 OB US FOLLOW-UP PER FETUS: CPT

## 2023-10-23 PROCEDURE — 76821 MIDDLE CEREBRAL ARTERY ECHO: CPT | Mod: 59

## 2023-10-27 ENCOUNTER — APPOINTMENT (OUTPATIENT)
Dept: OBGYN | Facility: CLINIC | Age: 38
End: 2023-10-27
Payer: MEDICAID

## 2023-10-27 VITALS
WEIGHT: 149 LBS | OXYGEN SATURATION: 100 % | SYSTOLIC BLOOD PRESSURE: 100 MMHG | BODY MASS INDEX: 24.79 KG/M2 | HEART RATE: 129 BPM | DIASTOLIC BLOOD PRESSURE: 70 MMHG

## 2023-10-27 DIAGNOSIS — Z23 ENCOUNTER FOR IMMUNIZATION: ICD-10-CM

## 2023-10-27 DIAGNOSIS — O36.5990 MATERNAL CARE FOR OTHER KNOWN OR SUSPECTED POOR FETAL GROWTH, UNSPECIFIED TRIMESTER, NOT APPLICABLE OR UNSPECIFIED: ICD-10-CM

## 2023-10-27 PROCEDURE — 81003 URINALYSIS AUTO W/O SCOPE: CPT | Mod: NC,QW

## 2023-10-27 PROCEDURE — 90715 TDAP VACCINE 7 YRS/> IM: CPT

## 2023-10-27 PROCEDURE — 90471 IMMUNIZATION ADMIN: CPT

## 2023-10-27 PROCEDURE — 0502F SUBSEQUENT PRENATAL CARE: CPT

## 2023-10-30 LAB
ALBUMIN SERPL ELPH-MCNC: 3.8 G/DL
ALP BLD-CCNC: 95 U/L
ALT SERPL-CCNC: 14 U/L
ANION GAP SERPL CALC-SCNC: 12 MMOL/L
AST SERPL-CCNC: 27 U/L
BASOPHILS # BLD AUTO: 0.06 K/UL
BASOPHILS NFR BLD AUTO: 0.6 %
BILIRUB SERPL-MCNC: 0.4 MG/DL
BUN SERPL-MCNC: 5 MG/DL
CALCIUM SERPL-MCNC: 9.4 MG/DL
CHLORIDE SERPL-SCNC: 100 MMOL/L
CMV IGG SERPL QL: 8 U/ML
CMV IGG SERPL-IMP: POSITIVE
CMV IGM SERPL QL: 22.4 AU/ML
CMV IGM SERPL QL: NEGATIVE
CO2 SERPL-SCNC: 21 MMOL/L
CREAT SERPL-MCNC: 0.57 MG/DL
CREAT SPEC-SCNC: 77 MG/DL
CREAT/PROT UR: 0.2 RATIO
EGFR: 118 ML/MIN/1.73M2
EOSINOPHIL # BLD AUTO: 0.13 K/UL
EOSINOPHIL NFR BLD AUTO: 1.3 %
GLUCOSE SERPL-MCNC: 128 MG/DL
HCT VFR BLD CALC: 35.6 %
HGB BLD-MCNC: 11.7 G/DL
IMM GRANULOCYTES NFR BLD AUTO: 1.9 %
LYMPHOCYTES # BLD AUTO: 1.69 K/UL
LYMPHOCYTES NFR BLD AUTO: 16.8 %
MAN DIFF?: NORMAL
MCHC RBC-ENTMCNC: 32.9 GM/DL
MCHC RBC-ENTMCNC: 33.4 PG
MCV RBC AUTO: 101.7 FL
MONOCYTES # BLD AUTO: 0.59 K/UL
MONOCYTES NFR BLD AUTO: 5.9 %
NEUTROPHILS # BLD AUTO: 7.41 K/UL
NEUTROPHILS NFR BLD AUTO: 73.5 %
PLATELET # BLD AUTO: 245 K/UL
POTASSIUM SERPL-SCNC: 3.9 MMOL/L
PROT SERPL-MCNC: 6.3 G/DL
PROT UR-MCNC: 13 MG/DL
RBC # BLD: 3.5 M/UL
RBC # FLD: 13.9 %
SODIUM SERPL-SCNC: 133 MMOL/L
T GONDII AB SER-IMP: NEGATIVE
T GONDII AB SER-IMP: NEGATIVE
T GONDII IGG SER QL: <3 IU/ML
T GONDII IGM SER QL: <3 AU/ML
WBC # FLD AUTO: 10.07 K/UL

## 2023-10-30 RX ORDER — CHLORHEXIDINE GLUCONATE 4 %
325 (65 FE) LIQUID (ML) TOPICAL DAILY
Qty: 90 | Refills: 1 | Status: ACTIVE | COMMUNITY
Start: 2023-08-14 | End: 1900-01-01

## 2023-10-31 ENCOUNTER — NON-APPOINTMENT (OUTPATIENT)
Age: 38
End: 2023-10-31

## 2023-10-31 LAB
CREAT 24H UR-MCNC: 1.1 G/24 H
CREAT ?TM UR-MCNC: 69 MG/DL
PROT 24H UR-MRATE: 18 MG/DL
PROT ?TM UR-MCNC: 24 HR
PROT UR-MCNC: 292 MG/24 H
SPECIMEN VOL 24H UR: 1625 ML

## 2023-11-01 LAB
B19V IGG SER QL IA: 7.7 INDEX
B19V IGG+IGM SER-IMP: NORMAL
B19V IGG+IGM SER-IMP: POSITIVE
B19V IGM FLD-ACNC: 0.22 INDEX
B19V IGM SER-ACNC: NEGATIVE

## 2023-11-06 ENCOUNTER — APPOINTMENT (OUTPATIENT)
Dept: ANTEPARTUM | Facility: CLINIC | Age: 38
End: 2023-11-06
Payer: MEDICAID

## 2023-11-06 ENCOUNTER — ASOB RESULT (OUTPATIENT)
Age: 38
End: 2023-11-06

## 2023-11-06 PROCEDURE — 76816 OB US FOLLOW-UP PER FETUS: CPT

## 2023-11-06 PROCEDURE — 76818 FETAL BIOPHYS PROFILE W/NST: CPT

## 2023-11-06 PROCEDURE — 76820 UMBILICAL ARTERY ECHO: CPT | Mod: 59

## 2023-11-14 ENCOUNTER — NON-APPOINTMENT (OUTPATIENT)
Age: 38
End: 2023-11-14

## 2023-11-15 ENCOUNTER — INPATIENT (INPATIENT)
Facility: HOSPITAL | Age: 38
LOS: 0 days | Discharge: HOME | DRG: 833 | End: 2023-11-16
Attending: OBSTETRICS & GYNECOLOGY | Admitting: OBSTETRICS & GYNECOLOGY
Payer: COMMERCIAL

## 2023-11-15 ENCOUNTER — APPOINTMENT (OUTPATIENT)
Dept: OBGYN | Facility: CLINIC | Age: 38
End: 2023-11-15
Payer: MEDICAID

## 2023-11-15 VITALS
TEMPERATURE: 98 F | HEART RATE: 135 BPM | RESPIRATION RATE: 19 BRPM | DIASTOLIC BLOOD PRESSURE: 69 MMHG | OXYGEN SATURATION: 100 % | SYSTOLIC BLOOD PRESSURE: 112 MMHG

## 2023-11-15 VITALS
SYSTOLIC BLOOD PRESSURE: 120 MMHG | BODY MASS INDEX: 24.96 KG/M2 | WEIGHT: 150 LBS | OXYGEN SATURATION: 100 % | HEART RATE: 147 BPM | DIASTOLIC BLOOD PRESSURE: 72 MMHG

## 2023-11-15 DIAGNOSIS — O26.899 OTHER SPECIFIED PREGNANCY RELATED CONDITIONS, UNSPECIFIED TRIMESTER: ICD-10-CM

## 2023-11-15 LAB
ALBUMIN SERPL ELPH-MCNC: 3.8 G/DL — SIGNIFICANT CHANGE UP (ref 3.3–5)
ALP SERPL-CCNC: 115 U/L — SIGNIFICANT CHANGE UP (ref 40–120)
ALT FLD-CCNC: 17 U/L — SIGNIFICANT CHANGE UP (ref 10–45)
ANION GAP SERPL CALC-SCNC: 11 MMOL/L — SIGNIFICANT CHANGE UP (ref 5–17)
APPEARANCE UR: ABNORMAL
APTT BLD: 28.3 SEC — SIGNIFICANT CHANGE UP (ref 24.5–35.6)
AST SERPL-CCNC: 28 U/L — SIGNIFICANT CHANGE UP (ref 10–40)
BACTERIA # UR AUTO: ABNORMAL /HPF
BASOPHILS # BLD AUTO: 0.06 K/UL — SIGNIFICANT CHANGE UP (ref 0–0.2)
BASOPHILS NFR BLD AUTO: 0.6 % — SIGNIFICANT CHANGE UP (ref 0–2)
BILIRUB SERPL-MCNC: 0.7 MG/DL — SIGNIFICANT CHANGE UP (ref 0.2–1.2)
BILIRUB UR-MCNC: NEGATIVE — SIGNIFICANT CHANGE UP
BLD GP AB SCN SERPL QL: NEGATIVE — SIGNIFICANT CHANGE UP
BUN SERPL-MCNC: 6 MG/DL — LOW (ref 7–23)
CALCIUM SERPL-MCNC: 9.7 MG/DL — SIGNIFICANT CHANGE UP (ref 8.4–10.5)
CAST: 1 /LPF — SIGNIFICANT CHANGE UP (ref 0–4)
CHLORIDE SERPL-SCNC: 103 MMOL/L — SIGNIFICANT CHANGE UP (ref 96–108)
CK MB CFR SERPL CALC: 1.6 NG/ML — SIGNIFICANT CHANGE UP (ref 0–6.7)
CK SERPL-CCNC: 53 U/L — SIGNIFICANT CHANGE UP (ref 25–170)
CO2 SERPL-SCNC: 21 MMOL/L — LOW (ref 22–31)
COARSE GRAN CASTS #/AREA URNS AUTO: PRESENT
COD CRY URNS QL: PRESENT
COLOR SPEC: SIGNIFICANT CHANGE UP
CREAT ?TM UR-MCNC: 183 MG/DL — SIGNIFICANT CHANGE UP
CREAT SERPL-MCNC: 0.61 MG/DL — SIGNIFICANT CHANGE UP (ref 0.5–1.3)
DIFF PNL FLD: NEGATIVE — SIGNIFICANT CHANGE UP
EGFR: 117 ML/MIN/1.73M2 — SIGNIFICANT CHANGE UP
EOSINOPHIL # BLD AUTO: 0.04 K/UL — SIGNIFICANT CHANGE UP (ref 0–0.5)
EOSINOPHIL NFR BLD AUTO: 0.4 % — SIGNIFICANT CHANGE UP (ref 0–6)
FIBRINOGEN PPP-MCNC: 484 MG/DL — HIGH (ref 200–445)
GLUCOSE SERPL-MCNC: 75 MG/DL — SIGNIFICANT CHANGE UP (ref 70–99)
GLUCOSE UR QL: NEGATIVE MG/DL — SIGNIFICANT CHANGE UP
HCT VFR BLD CALC: 36.3 % — SIGNIFICANT CHANGE UP (ref 34.5–45)
HGB BLD-MCNC: 12.4 G/DL — SIGNIFICANT CHANGE UP (ref 11.5–15.5)
IMM GRANULOCYTES NFR BLD AUTO: 2 % — HIGH (ref 0–0.9)
INR BLD: 0.98 — SIGNIFICANT CHANGE UP (ref 0.85–1.18)
KETONES UR-MCNC: 80 MG/DL
LDH SERPL L TO P-CCNC: 225 U/L — SIGNIFICANT CHANGE UP (ref 50–242)
LEUKOCYTE ESTERASE UR-ACNC: ABNORMAL
LYMPHOCYTES # BLD AUTO: 1.27 K/UL — SIGNIFICANT CHANGE UP (ref 1–3.3)
LYMPHOCYTES # BLD AUTO: 11.7 % — LOW (ref 13–44)
MAGNESIUM SERPL-MCNC: 1.8 MG/DL — SIGNIFICANT CHANGE UP (ref 1.6–2.6)
MCHC RBC-ENTMCNC: 33.4 PG — SIGNIFICANT CHANGE UP (ref 27–34)
MCHC RBC-ENTMCNC: 34.2 GM/DL — SIGNIFICANT CHANGE UP (ref 32–36)
MCV RBC AUTO: 97.8 FL — SIGNIFICANT CHANGE UP (ref 80–100)
MONOCYTES # BLD AUTO: 0.76 K/UL — SIGNIFICANT CHANGE UP (ref 0–0.9)
MONOCYTES NFR BLD AUTO: 7 % — SIGNIFICANT CHANGE UP (ref 2–14)
NEUTROPHILS # BLD AUTO: 8.5 K/UL — HIGH (ref 1.8–7.4)
NEUTROPHILS NFR BLD AUTO: 78.3 % — HIGH (ref 43–77)
NITRITE UR-MCNC: NEGATIVE — SIGNIFICANT CHANGE UP
NRBC # BLD: 0 /100 WBCS — SIGNIFICANT CHANGE UP (ref 0–0)
NT-PROBNP SERPL-SCNC: 43 PG/ML — SIGNIFICANT CHANGE UP (ref 0–300)
PH UR: 6.5 — SIGNIFICANT CHANGE UP (ref 5–8)
PHOSPHATE SERPL-MCNC: 3.3 MG/DL — SIGNIFICANT CHANGE UP (ref 2.5–4.5)
PLATELET # BLD AUTO: 236 K/UL — SIGNIFICANT CHANGE UP (ref 150–400)
POTASSIUM SERPL-MCNC: 3.7 MMOL/L — SIGNIFICANT CHANGE UP (ref 3.5–5.3)
POTASSIUM SERPL-SCNC: 3.7 MMOL/L — SIGNIFICANT CHANGE UP (ref 3.5–5.3)
PROT ?TM UR-MCNC: 27 MG/DL — HIGH (ref 0–12)
PROT SERPL-MCNC: 7.3 G/DL — SIGNIFICANT CHANGE UP (ref 6–8.3)
PROT UR-MCNC: 30 MG/DL
PROT/CREAT UR-RTO: 0.1 RATIO — SIGNIFICANT CHANGE UP (ref 0–0.2)
PROTHROM AB SERPL-ACNC: 11.2 SEC — SIGNIFICANT CHANGE UP (ref 9.5–13)
RBC # BLD: 3.71 M/UL — LOW (ref 3.8–5.2)
RBC # FLD: 13.5 % — SIGNIFICANT CHANGE UP (ref 10.3–14.5)
RBC CASTS # UR COMP ASSIST: 3 /HPF — SIGNIFICANT CHANGE UP (ref 0–4)
RH IG SCN BLD-IMP: POSITIVE — SIGNIFICANT CHANGE UP
SODIUM SERPL-SCNC: 135 MMOL/L — SIGNIFICANT CHANGE UP (ref 135–145)
SP GR SPEC: 1.03 — SIGNIFICANT CHANGE UP (ref 1–1.03)
SQUAMOUS # UR AUTO: 5 /HPF — SIGNIFICANT CHANGE UP (ref 0–5)
TROPONIN T, HIGH SENSITIVITY RESULT: <6 NG/L — SIGNIFICANT CHANGE UP (ref 0–51)
TSH SERPL-MCNC: 1.69 UIU/ML — SIGNIFICANT CHANGE UP (ref 0.27–4.2)
URATE SERPL-MCNC: 4.1 MG/DL — SIGNIFICANT CHANGE UP (ref 2.5–7)
UROBILINOGEN FLD QL: 1 MG/DL — SIGNIFICANT CHANGE UP (ref 0.2–1)
WBC # BLD: 10.85 K/UL — HIGH (ref 3.8–10.5)
WBC # FLD AUTO: 10.85 K/UL — HIGH (ref 3.8–10.5)
WBC UR QL: 21 /HPF — HIGH (ref 0–5)
YEAST-LIKE CELLS: PRESENT

## 2023-11-15 PROCEDURE — 93321 DOPPLER ECHO F-UP/LMTD STD: CPT | Mod: 26

## 2023-11-15 PROCEDURE — 99221 1ST HOSP IP/OBS SF/LOW 40: CPT

## 2023-11-15 PROCEDURE — 99205 OFFICE O/P NEW HI 60 MIN: CPT

## 2023-11-15 PROCEDURE — 71045 X-RAY EXAM CHEST 1 VIEW: CPT | Mod: 26

## 2023-11-15 PROCEDURE — 93308 TTE F-UP OR LMTD: CPT | Mod: 26

## 2023-11-15 RX ORDER — POTASSIUM CHLORIDE 20 MEQ
40 PACKET (EA) ORAL ONCE
Refills: 0 | Status: COMPLETED | OUTPATIENT
Start: 2023-11-15 | End: 2023-11-15

## 2023-11-15 RX ORDER — MAGNESIUM SULFATE 500 MG/ML
2 VIAL (ML) INJECTION ONCE
Refills: 0 | Status: COMPLETED | OUTPATIENT
Start: 2023-11-15 | End: 2023-11-15

## 2023-11-15 RX ORDER — FERROUS SULFATE 325(65) MG
325 TABLET ORAL DAILY
Refills: 0 | Status: DISCONTINUED | OUTPATIENT
Start: 2023-11-15 | End: 2023-11-16

## 2023-11-15 RX ADMIN — Medication 40 MILLIEQUIVALENT(S): at 23:56

## 2023-11-15 RX ADMIN — Medication 25 GRAM(S): at 23:24

## 2023-11-15 NOTE — CONSULT NOTE ADULT - SUBJECTIVE AND OBJECTIVE BOX
**Incomplete Note**    Cardiology Consult      HPI:        Pt seen and examined at bedside, NAD, denies chest pain/pressure/discomfort. ROS s/f ?,      Review of Systems:  CONSTITUTIONAL:  No weight loss, fever, chills, weakness or fatigue.  HEENT:  Eyes:  No visual loss, blurred vision, double vision or yellow sclerae. Ears, Nose, Throat:  No hearing loss, sneezing, congestion, runny nose or sore throat.  SKIN:  No rash or itching.  CARDIOVASCULAR:  No chest pain, chest pressure or chest discomfort. No palpitations or edema.  RESPIRATORY:  No shortness of breath, cough or sputum.  GASTROINTESTINAL:  No anorexia, nausea, vomiting or diarrhea. No abdominal pain or blood.  GENITOURINARY: No Burning on urination.   NEUROLOGICAL:  see HPI  MUSCULOSKELETAL:  No muscle, back pain, joint pain or stiffness.  HEMATOLOGIC:  No anemia, bleeding or bruising.  LYMPHATICS:  No enlarged nodes. No history of splenectomy.  PSYCHIATRIC:  No history of depression or anxiety.  ENDOCRINOLOGIC:  No reports of sweating, cold or heat intolerance. No polyuria or polydipsia.  ALLERGIES:  No history of asthma, hives, eczema or rhinitis.    PAST MEDICAL & SURGICAL HISTORY:  No pertinent past medical history      No significant past surgical history        SOCIAL HISTORY:  FAMILY HISTORY:    ALLERGIES: 	  No Known Allergies          MEDICATIONS:  ferrous    sulfate 325 milliGRAM(s) Oral daily  prenatal multivitamin 1 Tablet(s) Oral daily      T(C): --  HR: --  BP: --  RR: --  SpO2: --      PHYSICAL EXAM:    Constitutional: resting comfortably in bed; NAD  HEENT: NC/AT, PERRL, EOMI, anicteric sclera, no nasal discharge; uvula midline, no oropharyngeal erythema or exudates; MMM  Neck: supple; no thyromegaly, JVP ? cm H20, JVD +/-  Respiratory: CTA B/L; no W/R/R, no retractions  Cardiac: +S1/S2; RRR; no M/R/G; PMI non-displaced  Gastrointestinal: soft, NT/ND; no rebound or guarding; +BSx4  Extremities: WWP, no clubbing or cyanosis; no peripheral edema  Musculoskeletal: NROM x4; no joint swelling, tenderness or erythema  Vascular: 2+ radial, DP/PT pulses B/L  Dermatologic: skin warm, dry and intact; no rashes, wounds, or scars  Lymphatic: no submandibular or cervical LAD  Neurologic: AAOx3; CNII-XII grossly intact; no focal deficits        I&O's Summary      LABS:	 	       Cardiology Consult      HPI:         Pt seen and examined at bedside, NAD, denies chest pain/pressure/discomfort. ROS s/f ?,      Review of Systems:  CONSTITUTIONAL:  No weight loss, fever, chills, weakness or fatigue.  HEENT:  Eyes:  No visual loss, blurred vision, double vision or yellow sclerae. Ears, Nose, Throat:  No hearing loss, sneezing, congestion, runny nose or sore throat.  SKIN:  No rash or itching.  CARDIOVASCULAR:  No chest pain, chest pressure or chest discomfort. No palpitations or edema.  RESPIRATORY:  No shortness of breath, cough or sputum.  GASTROINTESTINAL:  No anorexia, nausea, vomiting or diarrhea. No abdominal pain or blood.  GENITOURINARY: No Burning on urination.   NEUROLOGICAL:  see HPI  MUSCULOSKELETAL:  No muscle, back pain, joint pain or stiffness.  HEMATOLOGIC:  No anemia, bleeding or bruising.  LYMPHATICS:  No enlarged nodes. No history of splenectomy.  PSYCHIATRIC:  No history of depression or anxiety.  ENDOCRINOLOGIC:  No reports of sweating, cold or heat intolerance. No polyuria or polydipsia.  ALLERGIES:  No history of asthma, hives, eczema or rhinitis.    PAST MEDICAL & SURGICAL HISTORY:  No pertinent past medical history      No significant past surgical history        SOCIAL HISTORY:  FAMILY HISTORY:    ALLERGIES: 	  No Known Allergies          MEDICATIONS:  ferrous    sulfate 325 milliGRAM(s) Oral daily  prenatal multivitamin 1 Tablet(s) Oral daily      T(C): 36.7 (11-15-23 @ 23:15), Max: 36.8 (11-15-23 @ 19:23)  HR: 116 (11-15-23 @ 23:15) (116 - 135)  BP: 109/72 (11-15-23 @ 23:15) (109/72 - 112/69)  RR: 18 (11-15-23 @ 23:15) (18 - 19)  SpO2: 99% (11-15-23 @ 23:15) (99% - 100%)      PHYSICAL EXAM:    Constitutional: resting comfortably in bed; NAD  HEENT: NC/AT, PERRL, EOMI, anicteric sclera, no nasal discharge; uvula midline, no oropharyngeal erythema or exudates; MMM  Neck: supple; no thyromegaly, JVP ? cm H20, JVD +/-  Respiratory: CTA B/L; no W/R/R, no retractions  Cardiac: +S1/S2; RRR; no M/R/G; PMI non-displaced  Gastrointestinal: soft, NT/ND; no rebound or guarding; +BSx4  Extremities: WWP, no clubbing or cyanosis; no peripheral edema  Musculoskeletal: NROM x4; no joint swelling, tenderness or erythema  Vascular: 2+ radial, DP/PT pulses B/L  Dermatologic: skin warm, dry and intact; no rashes, wounds, or scars  Lymphatic: no submandibular or cervical LAD  Neurologic: AAOx3; CNII-XII grossly intact; no focal deficits        I&O's Summary    Height (cm): 167.6 (11-15 @ 19:25)  Weight (kg): 69 (11-15 @ 19:25)  BMI (kg/m2): 24.6 (11-15 @ 19:25)  BSA (m2): 1.78 (11-15 @ 19:25)  LABS:	 	                        12.4   10.85 )-----------( 236      ( 15 Nov 2023 19:15 )             36.3     11-15    135  |  103  |  6<L>  ----------------------------<  75  3.7   |  21<L>  |  0.61    Ca    9.7      15 Nov 2023 19:15  Phos  3.3     11-15  Mg     1.8     11-15    TPro  7.3  /  Alb  3.8  /  TBili  0.7  /  DBili  x   /  AST  28  /  ALT  17  /  AlkPhos  115  11-15    CARDIAC MARKERS ( 15 Nov 2023 21:43 )  x     / x     / 53 U/L / x     / 1.6 ng/mL      proBNP:   Lipid Profile:   HgA1c:   TSH: Thyroid Stimulating Hormone, Serum: 1.690 uIU/mL (11-15 @ 19:15)             Cardiology Consult      HPI: 38F  at 32 4/7 wga (DINOR[place] 23) presents to OB service after being sent in by her [facility] OB for tachycardia to 140s during her checkup. She has had palpitations as far back as 10 years ago for which she was seen by a cardiologist and had a holter monitor placed with no reported findings. Regardless of whether she is sitting down or ambulating, she experiences palpitations with no alleviating or aggravating factors. She denies any history of chest pain, chest discomfort, presyncope, syncope, PE, DVT, orthopnea, PND, changes in skin/hair, bowel habits, changes in mood, or episodes of diaphoresis/flushing/diarrhea.      Pt seen and examined at bedside, NAD, denies chest pain/pressure/discomfort. ROS negative      Review of Systems:  CONSTITUTIONAL:  No weight loss, fever, chills, weakness or fatigue.  HEENT:  Eyes:  No visual loss, blurred vision, double vision or yellow sclerae. Ears, Nose, Throat:  No hearing loss, sneezing, congestion, runny nose or sore throat.  SKIN:  No rash or itching.  CARDIOVASCULAR:  No chest pain, chest pressure or chest discomfort. No palpitations or edema.  RESPIRATORY:  No shortness of breath, cough or sputum.  GASTROINTESTINAL:  No anorexia, nausea, vomiting or diarrhea. No abdominal pain or blood.  GENITOURINARY: No Burning on urination.   NEUROLOGICAL:  see HPI  MUSCULOSKELETAL:  No muscle, back pain, joint pain or stiffness.  HEMATOLOGIC:  No anemia, bleeding or bruising.  LYMPHATICS:  No enlarged nodes. No history of splenectomy.  PSYCHIATRIC:  No history of depression or anxiety.  ENDOCRINOLOGIC:  No reports of sweating, cold or heat intolerance. No polyuria or polydipsia.  ALLERGIES:  No history of asthma, hives, eczema or rhinitis.    PAST MEDICAL & SURGICAL HISTORY:  No pertinent past medical history      No significant past surgical history        SOCIAL HISTORY:  FAMILY HISTORY:    ALLERGIES: 	  No Known Allergies          MEDICATIONS:  ferrous    sulfate 325 milliGRAM(s) Oral daily  prenatal multivitamin 1 Tablet(s) Oral daily      T(C): 36.7 (11-15-23 @ 23:15), Max: 36.8 (11-15-23 @ 19:23)  HR: 116 (11-15-23 @ 23:15) (116 - 135)  BP: 109/72 (11-15-23 @ 23:15) (109/72 - 112/69)  RR: 18 (11-15-23 @ 23:15) (18 - 19)  SpO2: 99% (11-15-23 @ 23:15) (99% - 100%)      PHYSICAL EXAM:    Constitutional: resting comfortably in bed; NAD  HEENT: NC/AT, PERRL, EOMI, anicteric sclera, no nasal discharge; uvula midline, no oropharyngeal erythema or exudates; MMM  Neck: supple; no thyromegaly, JVP <8 cm H20, JVD -  Respiratory: CTA B/L; no W/R/R, no retractions  Cardiac: +S1/S2; Regularly regular tachycardic; no M/R/G; PMI non-displaced  Gastrointestinal: soft, NT/ND; no rebound or guarding; +BSx4  Extremities: WWP, no clubbing or cyanosis; no peripheral edema  Musculoskeletal: NROM x4; no joint swelling, tenderness or erythema  Vascular: 2+ radial, DP/PT pulses B/L  Dermatologic: skin warm, dry and intact; no rashes, wounds, or scars  Lymphatic: no submandibular or cervical LAD  Neurologic: AAOx3; CNII-XII grossly intact; no focal deficits        I&O's Summary    Height (cm): 167.6 (11-15 @ 19:25)  Weight (kg): 69 (11-15 @ 19:25)  BMI (kg/m2): 24.6 (11-15 @ 19:25)  BSA (m2): 1.78 (11-15 @ 19:25)  LABS:	 	                        12.4   10.85 )-----------( 236      ( 15 Nov 2023 19:15 )             36.3     11-15    135  |  103  |  6<L>  ----------------------------<  75  3.7   |  21<L>  |  0.61    Ca    9.7      15 Nov 2023 19:15  Phos  3.3     11-15  Mg     1.8     11-15    TPro  7.3  /  Alb  3.8  /  TBili  0.7  /  DBili  x   /  AST  28  /  ALT  17  /  AlkPhos  115  11-15    CARDIAC MARKERS ( 15 Nov 2023 21:43 )  x     / x     / 53 U/L / x     / 1.6 ng/mL      proBNP:   Lipid Profile:   HgA1c:   TSH: Thyroid Stimulating Hormone, Serum: 1.690 uIU/mL (11-15 @ 19:15)

## 2023-11-15 NOTE — CONSULT NOTE ADULT - ASSESSMENT
38F  at 27 weeks of gestation presents w/?    Review of Studies:    Recommendations:    #Tachycardia  -Etiology likely 2/2 ?  -Please obtain STAT ECG, vitals, CBC w/diff, CMP w/electrolytes, Chest X-Ray  -Please obtain a nonurgent TTE w/spectral dopplers  -Please obtain daily AM ECGs  -Please keep K>4 and Mg>2  -Please have the pt f/u with Dr. Samantha Dimas within 2 weeks of d/c         Recommendations above are preliminary pending discussion with an attending cardiologist  Plan was discussed with primary team  We'll continue to follow, thank you for the consultation      Eddie Moe (PGY5)  Cardiovascular Disease Fellow  Consult Pager: [phone]         38F  at 27 weeks of gestation presents w/?    Review of Studies:    Recommendations:    #Tachycardia  -Etiology likely 2/2 ?  -Please obtain STAT ECG, vitals, CBC w/diff, CMP w/electrolytes, Chest X-Ray  -Please obtain a nonurgent TTE w/spectral dopplers  -Please obtain daily AM ECGs  -Please keep K>4 and Mg>2  -Please have the pt f/u with Dr. Samantha Dimas within 2 weeks of d/c         Recommendations above are preliminary pending discussion with an attending cardiologist  Plan was discussed with primary team  We'll continue to follow, thank you for the consultation      Eddie Moe (PGY5)  Cardiovascular Disease Fellow  Consult Pager: [phone]         38F  at 27 weeks of gestation presents w/?    Review of Studies:    Recommendations:    #Tachycardia  -Etiology likely 2/2 ?  -Please obtain STAT ECG, vitals, CBC w/diff, CMP w/electrolytes, Chest X-Ray  -Please obtain a nonurgent TTE w/spectral dopplers  -Please obtain daily AM ECGs  -Please keep K>4 and Mg>2  -Please have the pt f/u with Dr. Samantha Dimas within 2 weeks of d/c         Recommendations above are preliminary pending discussion with an attending cardiologist  Plan was discussed with primary team  We'll continue to follow, thank you for the consultation      Eddie Moe (PGY5)  Cardiovascular Disease Fellow  Consult Pager: [phone]         38F  at 32 4/7 wga (DINORA 24) presents w/?    Review of Studies:  ECG 11/15/23:  Limited TTE by fellow 11/15/23:       Recommendations:    #Tachycardia  -Etiology likely 2/2 ?  -Please obtain STAT ECG, vitals, CBC w/diff, CMP w/electrolytes, Chest X-Ray  -Please obtain a nonurgent TTE w/spectral dopplers  -Please obtain daily AM ECGs  -Please keep K>4 and Mg>2  -Please have the pt f/u with Dr. Samantha Dimas within 2 weeks of d/c         Recommendations above are preliminary pending discussion with an attending cardiologist  Plan was discussed with primary team  We'll continue to follow, thank you for the consultation      Eddie Moe (PGY5)  Cardiovascular Disease Fellow  Consult Pager: [phone]         38F  at 32 4/7 wga (DINORA 24) presents w/?    Review of Studies:  ECG 11/15/23:  Limited TTE by fellow 11/15/23:       Recommendations:    #Tachycardia  -Etiology likely 2/2 ?  -Please obtain STAT ECG, vitals, CBC w/diff, CMP w/electrolytes, Chest X-Ray  -Please obtain a nonurgent TTE w/spectral dopplers  -Please obtain daily AM ECGs  -Please keep K>4 and Mg>2  -Please have the pt f/u with Dr. Samantha Dimas within 2 weeks of d/c         Recommendations above are preliminary pending discussion with an attending cardiologist  Plan was discussed with primary team  We'll continue to follow, thank you for the consultation      Eddie Moe (PGY5)  Cardiovascular Disease Fellow  Consult Pager: [phone]         38F  at 32 4/7 wga (DINORA 24) presents w/?    Review of Studies:  ECG 11/15/23:  Limited TTE by fellow 11/15/23:       Recommendations:    #Tachycardia  -Etiology likely 2/2 ?  -Please obtain STAT ECG, vitals, CBC w/diff, CMP w/electrolytes, Chest X-Ray  -Please obtain a nonurgent TTE w/spectral dopplers  -Please obtain daily AM ECGs  -Please keep K>4 and Mg>2  -Please have the pt f/u with Dr. Samantha Dimas within 2 weeks of d/c         Recommendations above are preliminary pending discussion with an attending cardiologist  Plan was discussed with primary team  We'll continue to follow, thank you for the consultation      Eddie Moe (PGY5)  Cardiovascular Disease Fellow  Consult Pager: [phone]         38F  at 32 4/7 wga (DINORA 24) presents to OB service after being sent in by her MFM OB for tachycardia to 140s during her checkup. She was found to be hemodynamically sound and in sinus tachycardia with no clinical evidence of PE, ACS, or deranged thyroid function studies. The etiology of her inappropriate sinus tachycardia is likely dysautonomia given her 10 year hx of palpitations and w/u in the past for tachycardia. Cardiology consulted for evaluation of her tachycardia.     Review of Studies:  ECG 11/15/23 @19:17 and 22:58: Sinus tachycardia with no evidence of RV strain, conduction disease, or acute ischemia   Limited TTE by fellow 11/15/23 @21:30: Tachycardic, Normal LV fx (hyperdynamic) and size, normal RV fx and size, small pericardial effusion  without echocardiographic evidence of tamponade, normal TAPSE, no s/f valvulopathy, PASP 14mmhg  **TTE findings were reviewed with an on call ECHO attending Dr. Finkelstein at 23:00 on 11/15/23      Recommendations:    #Inappropriate Sinus Tachycardia  -No significant evidence of PE, cardiac enzymes unremarkable, thyroid levels WNL  -Etiology likely dysautonomia given her 10 year hx of palpitations and w/u in the past for tachycardia.  -Please obtain STAT ECG, vitals, CBC w/diff, CMP w/electrolytes, TSH, Chest X-Ray  -Please obtain a nonurgent TTE w/spectral dopplers  -Daily AM ECGs  -AM urine and plasma metanephrine level (pheo w/u)  -Maintain K>4 and Mg>2  -Encourage adequate PO intake and left lateral decubitus while sleeping  -Please have the pt f/u with Dr. Samantha Dimas within 2 weeks of d/c     Recommendations above are preliminary pending discussion with an attending cardiologist  Plan was discussed with primary team  We'll continue to follow, thank you for the consultation    Eddie Moe (PGY5)  Cardiovascular Disease Fellow  Consult Pager: [phone]         38F  at 32 4/7 wga (DINORA 24) presents to OB service after being sent in by her MFM OB for tachycardia to 140s during her checkup. She was found to be hemodynamically sound and in sinus tachycardia with no clinical evidence of PE, ACS, or deranged thyroid function studies. The etiology of her inappropriate sinus tachycardia is likely dysautonomia given her 10 year hx of palpitations and w/u in the past for tachycardia. Cardiology consulted for evaluation of her tachycardia.     Review of Studies:  ECG 11/15/23 @19:17 and 22:58: Sinus tachycardia with no evidence of RV strain, conduction disease, or acute ischemia   Limited TTE by fellow 11/15/23 @21:30: Tachycardic, Normal LV fx (hyperdynamic) and size, normal RV fx and size, small pericardial effusion  without echocardiographic evidence of tamponade, normal TAPSE, no s/f valvulopathy, PASP 14mmhg  **TTE findings were reviewed with an on call ECHO attending Dr. Finkelstein at 23:00 on 11/15/23      Recommendations:    #Inappropriate Sinus Tachycardia  -No significant evidence of PE, cardiac enzymes unremarkable, thyroid levels WNL  -Etiology likely dysautonomia given her 10 year hx of palpitations and w/u in the past for tachycardia.  -Please obtain STAT ECG, vitals, CBC w/diff, CMP w/electrolytes, TSH, Chest X-Ray  -Please obtain a nonurgent TTE w/spectral dopplers  -Daily AM ECGs  -AM urine and plasma metanephrine level (pheo w/u)  -Maintain K>4 and Mg>2  -Encourage adequate PO intake and left lateral decubitus while sleeping  -Please have the pt f/u with Dr. Samantha Dimas within 2 weeks of d/c     Recommendations above are preliminary pending discussion with an attending cardiologist  Plan was discussed with primary team  We'll continue to follow, thank you for the consultation    Eddie Moe (PGY5)  Cardiovascular Disease Fellow  Consult Pager: [phone]         38F  at 32 4/7 wga (DINORA 24) presents to OB service after being sent in by her MFM OB for tachycardia to 140s during her checkup. She was found to be hemodynamically sound and in sinus tachycardia with no clinical evidence of PE, ACS, or deranged thyroid function studies. The etiology of her inappropriate sinus tachycardia is likely dysautonomia given her 10 year hx of palpitations and w/u in the past for tachycardia. Cardiology consulted for evaluation of her tachycardia.     Review of Studies:  ECG 11/15/23 @19:17 and 22:58: Sinus tachycardia with no evidence of RV strain, conduction disease, or acute ischemia   Limited TTE by fellow 11/15/23 @21:30: Tachycardic, Normal LV fx (hyperdynamic) and size, normal RV fx and size, small pericardial effusion  without echocardiographic evidence of tamponade, normal TAPSE, no s/f valvulopathy, PASP 14mmhg  **TTE findings were reviewed with an on call ECHO attending Dr. Finkelstein at 23:00 on 11/15/23      Recommendations:    #Inappropriate Sinus Tachycardia  -No significant evidence of PE, cardiac enzymes unremarkable, thyroid levels WNL  -Etiology likely dysautonomia given her 10 year hx of palpitations and w/u in the past for tachycardia.  -Please obtain STAT ECG, vitals, CBC w/diff, CMP w/electrolytes, TSH, Chest X-Ray  -Please obtain a nonurgent TTE w/spectral dopplers  -Daily AM ECGs  -AM urine and plasma metanephrine level (pheo w/u)  -Maintain K>4 and Mg>2  -Encourage adequate PO intake and left lateral decubitus while sleeping  -Please have the pt f/u with Dr. Samantha Dimas within 2 weeks of d/c     Recommendations above are preliminary pending discussion with an attending cardiologist  Plan was discussed with primary team  We'll continue to follow, thank you for the consultation    Eddie Moe (PGY5)  Cardiovascular Disease Fellow  Consult Pager: [phone]         38F  at 32 4/7 wga (DINORA 24) presents to OB service after being sent in by her MFM OB for tachycardia to 140s during her checkup. She was found to be hemodynamically sound and in sinus tachycardia with no clinical evidence of PE, ACS, or deranged thyroid function studies. The etiology of her inappropriate sinus tachycardia is likely dysautonomia given her 10 year hx of palpitations and w/u in the past for tachycardia. Cardiology consulted for evaluation of her tachycardia.     Review of Studies:  ECG 11/15/23 @19:17 and 22:58: Sinus tachycardia with no evidence of RV strain, conduction disease, or acute ischemia   Limited TTE by fellow 11/15/23 @21:30: Tachycardic, Normal LV fx (hyperdynamic) and size, normal RV fx and size, small pericardial effusion  without echocardiographic evidence of tamponade, normal TAPSE, no s/f valvulopathy, PASP 14mmhg  **TTE Images were reviewed with an on call ECHO attending Dr. Finkelstein at 23:00 on 11/15/23      Recommendations:    #Inappropriate Sinus Tachycardia  -No significant clincial evidence of PE, cardiac enzymes unremarkable, thyroid levels WNL  -Etiology likely dysautonomia given her 10 year hx of palpitations and w/u in the past for tachycardia.  -Please obtain STAT vitals, ECG, CBC w/diff, CMP w/electrolytes, TSH, Chest X-Ray, and limited TTE order (fellow)  -Please obtain a nonurgent TTE w/spectral dopplers for the AM  -AM urine and plasma metanephrine level (pheochromocytoma w/u)  -Daily AM ECGs  -Maintain K>4 and Mg>2  -Encourage adequate PO intake and left lateral decubitus positioning while sleeping  -Please have the pt f/u with Dr. Samantha Dimas within 2 weeks of d/c     Recommendations above are preliminary pending discussion with an attending cardiologist  Plan was discussed with primary team  We'll continue to follow, thank you for the consultation    Eddie Moe (PGY5)  Cardiovascular Disease Fellow  Consult Pager: [phone]         38F  at 32 4/7 wga (DINORA 24) presents to OB service after being sent in by her MFM OB for tachycardia to 140s during her checkup. She was found to be hemodynamically sound and in sinus tachycardia with no clinical evidence of PE, ACS, or deranged thyroid function studies. The etiology of her inappropriate sinus tachycardia is likely dysautonomia given her 10 year hx of palpitations and w/u in the past for tachycardia. Cardiology consulted for evaluation of her tachycardia.     Review of Studies:  ECG 11/15/23 @19:17 and 22:58: Sinus tachycardia with no evidence of RV strain, conduction disease, or acute ischemia   Limited TTE by fellow 11/15/23 @21:30: Tachycardic, Normal LV fx (hyperdynamic) and size, normal RV fx and size, small pericardial effusion  without echocardiographic evidence of tamponade, normal TAPSE, no s/f valvulopathy, PASP 14mmhg  **TTE Images were reviewed with an on call ECHO attending Dr. Finkelstein at 23:00 on 11/15/23      Recommendations:    #Inappropriate Sinus Tachycardia  -No significant clincial evidence of PE, cardiac enzymes unremarkable, thyroid levels WNL  -Etiology likely dysautonomia given her 10 year hx of palpitations and w/u in the past for tachycardia.  -Please obtain STAT vitals, ECG, CBC w/diff, CMP w/electrolytes, TSH, Chest X-Ray, and limited TTE order (fellow)  -Please obtain a nonurgent TTE w/spectral dopplers for the AM  -AM urine and plasma metanephrine level (pheochromocytoma w/u)  -Daily AM ECGs  -Maintain K>4 and Mg>2  -Encourage adequate PO intake and left lateral decubitus positioning while sleeping  -Please have the pt f/u with Dr. Samantha Dimas within 2 weeks of d/c     Recommendations above are preliminary pending discussion with an attending cardiologist  Plan was discussed with primary team  We'll continue to follow, thank you for the consultation    Eddie Moe (PGY5)  Cardiovascular Disease Fellow  Consult Pager: [phone]         38F  at 32 4/7 wga (DINORA 24) presents to OB service after being sent in by her MFM OB for tachycardia to 140s during her checkup. She was found to be hemodynamically sound and in sinus tachycardia with no clinical evidence of PE, ACS, or deranged thyroid function studies. The etiology of her inappropriate sinus tachycardia is likely dysautonomia given her 10 year hx of palpitations and w/u in the past for tachycardia. Cardiology consulted for evaluation of her tachycardia.     Review of Studies:  ECG 11/15/23 @19:17 and 22:58: Sinus tachycardia with no evidence of RV strain, conduction disease, or acute ischemia   Limited TTE by fellow 11/15/23 @21:30: Tachycardic, Normal LV fx (hyperdynamic) and size, normal RV fx and size, small pericardial effusion  without echocardiographic evidence of tamponade, normal TAPSE, no s/f valvulopathy, PASP 14mmhg  **TTE Images were reviewed with an on call ECHO attending Dr. Finkelstein at 23:00 on 11/15/23      Recommendations:    #Inappropriate Sinus Tachycardia  -No significant clincial evidence of PE, cardiac enzymes unremarkable, thyroid levels WNL  -Etiology likely dysautonomia given her 10 year hx of palpitations and w/u in the past for tachycardia.  -Please obtain STAT vitals, ECG, CBC w/diff, CMP w/electrolytes, TSH, Chest X-Ray, and limited TTE order (fellow)  -Please obtain a nonurgent TTE w/spectral dopplers for the AM  -AM urine and plasma metanephrine level (pheochromocytoma w/u)  -Daily AM ECGs  -Maintain K>4 and Mg>2  -Encourage adequate PO intake and left lateral decubitus positioning while sleeping  -Please have the pt f/u with Dr. Samantha Dimas within 2 weeks of d/c     Recommendations above are preliminary pending discussion with an attending cardiologist  Plan was discussed with primary team  We'll continue to follow, thank you for the consultation    Eddie Moe ([email or web address])  Cardiovascular Disease Fellow  Consult Pager: [phone]

## 2023-11-15 NOTE — CONSULT NOTE ADULT - ATTENDING COMMENTS
Patient is a 39 yo F  at 32 4/7 weeks (DINORA 24)  sent in by her MFM OB for tachycardia to 140s during her checkup. Cardiology consulted for evaluation of tachycardia.     Review of Studies:  - ECG 11/15/23: ST at 117bpm with No specific ST chnages  - ECG 23: ST at 124 bpm  - TTE 2023: The left ventricle is normal in size, wall thickness, and systolic function with a calculated ejection fraction of 55-60%. There are no regional wall motion abnormalities seen. There is normal left ventricular diastolic function and filling pressure.      #Inappropriate Sinus Tachycardia  - Patient with reported history of palpitations for which she underwent 1 day event monitor over 10 years ago. She reports baseline HR in the 100, frequently reaching 130's-140's when she is animated or anxious. She states she has significant while coat syndrome with palpitations when around physicians.  - HR overnight on admission showed HR ranging from low 100's-130's even while asleep, suggesting persistent sinus tach  - ECGS reviewed showing sinus tach without ischemic changes  - ECHO performed today showed normal Biventricular function without valvular heart disease  - Labs unremarkable with normal BNP, HS Trop T, liver enzymes  - Clinically patient is warm, well perfused and compensated without lower extremity edema or hypoxia  - Though index of suspicion is low, would obtain Lower extremity US to r/o DVT.   - Patient will need 2 week outpatient Ambulatory ECG monitoring. Please call  department [phone] for planned Zio patch placement on 2 [place] on .  - Will recommend Starting Toprol 25 mg po daily after first week of Ambulatory ECG monitoring. This will allow the contrast and evaluation of one week monitoring pre and post treatment.   - Patient will need close cardiology outpatient follow up  - Please ensure outpatient appointment is established prior to DC with Dr Samantha Dimas, or Zulay Izquierdo based on availability or insurance. Patient is a 39 yo F  at 32 4/7 weeks (DINORA 24)  sent in by her MFM OB for tachycardia to 140s during her checkup. Cardiology consulted for evaluation of tachycardia.     Review of Studies:  - ECG 11/15/23: ST at 117bpm with No specific ST chnages  - ECG 23: ST at 124 bpm  - TTE 2023: The left ventricle is normal in size, wall thickness, and systolic function with a calculated ejection fraction of 55-60%. There are no regional wall motion abnormalities seen. There is normal left ventricular diastolic function and filling pressure.      #Inappropriate Sinus Tachycardia  - Patient with reported history of palpitations for which she underwent 1 day event monitor over 10 years ago. She reports baseline HR in the 100, frequently reaching 130's-140's when she is animated or anxious. She states she has significant while coat syndrome with palpitations when around physicians.  - HR overnight on admission showed HR ranging from low 100's-130's even while asleep, suggesting persistent sinus tach  - ECGS reviewed showing sinus tach without ischemic changes  - ECHO performed today showed normal Biventricular function without valvular heart disease  - Labs unremarkable with normal BNP, HS Trop T, liver enzymes  - Clinically patient is warm, well perfused and compensated without lower extremity edema or hypoxia  - Though index of suspicion is low, would obtain Lower extremity US to r/o DVT.   - Patient will need 2 week outpatient Ambulatory ECG monitoring. Please call  department [phone] for planned Zio patch placement on 2 [place] on .  - Will recommend Starting Toprol 25 mg po daily after first week of Ambulatory ECG monitoring. This will allow the contrast and evaluation of one week monitoring pre and post treatment.   - Patient will need close cardiology outpatient follow up  - Please ensure outpatient appointment is established prior to DC with Dr Samantha Dimas, or Zulay Izquierdo based on availability or insurance. Patient is a 37 yo F  at 32 4/7 weeks (DINORA 24)  sent in by her MFM OB for tachycardia to 140s during her checkup. Cardiology consulted for evaluation of tachycardia.     Review of Studies:  - ECG 11/15/23: ST at 117bpm with No specific ST chnages  - ECG 23: ST at 124 bpm  - TTE 2023: The left ventricle is normal in size, wall thickness, and systolic function with a calculated ejection fraction of 55-60%. There are no regional wall motion abnormalities seen. There is normal left ventricular diastolic function and filling pressure.      #Inappropriate Sinus Tachycardia  - Patient with reported history of palpitations for which she underwent 1 day event monitor over 10 years ago. She reports baseline HR in the 100, frequently reaching 130's-140's when she is animated or anxious. She states she has significant while coat syndrome with palpitations when around physicians.  - HR overnight on admission showed HR ranging from low 100's-130's even while asleep, suggesting persistent sinus tach  - ECGS reviewed showing sinus tach without ischemic changes  - ECHO performed today showed normal Biventricular function without valvular heart disease  - Labs unremarkable with normal BNP, HS Trop T, liver enzymes  - Clinically patient is warm, well perfused and compensated without lower extremity edema or hypoxia  - Though index of suspicion is low, would obtain Lower extremity US to r/o DVT.   - Patient will need 2 week outpatient Ambulatory ECG monitoring. Please call  department [phone] for planned Zio patch placement on 2 [place] on .  - Will recommend Starting Toprol 25 mg po daily after first week of Ambulatory ECG monitoring. This will allow the contrast and evaluation of one week monitoring pre and post treatment.   - Patient will need close cardiology outpatient follow up  - Please ensure outpatient appointment is established prior to DC with Dr Samantha Dimas, or Zulay Izquierdo based on availability or insurance.

## 2023-11-15 NOTE — PATIENT PROFILE OB - FALL HARM RISK - UNIVERSAL INTERVENTIONS
Bed in lowest position, wheels locked, appropriate side rails in place/Call bell, personal items and telephone in reach/Instruct patient to call for assistance before getting out of bed or chair/Non-slip footwear when patient is out of bed/[place] to call system/Physically safe environment - no spills, clutter or unnecessary equipment/Purposeful Proactive Rounding/Room/bathroom lighting operational, light cord in reach Bed in lowest position, wheels locked, appropriate side rails in place/Call bell, personal items and telephone in reach/Instruct patient to call for assistance before getting out of bed or chair/Non-slip footwear when patient is out of bed/[place] to call system/Physically safe environment - no spills, clutter or unnecessary equipment/Purposeful Proactive Rounding/Room/bathroom lighting operational, light cord in reach Bed in lowest position, wheels locked, appropriate side rails in place/Call bell, personal items and telephone in reach/Instruct patient to call for assistance before getting out of bed or chair/Non-slip footwear when patient is out of bed/[place] to call system/Physically safe environment - no spills, clutter or unnecessary equipment/Purposeful Proactive Rounding/Room/bathroom lighting operational, light cord in reach

## 2023-11-16 ENCOUNTER — TRANSCRIPTION ENCOUNTER (OUTPATIENT)
Age: 38
End: 2023-11-16

## 2023-11-16 VITALS
HEART RATE: 107 BPM | TEMPERATURE: 98 F | RESPIRATION RATE: 18 BRPM | DIASTOLIC BLOOD PRESSURE: 64 MMHG | OXYGEN SATURATION: 100 % | SYSTOLIC BLOOD PRESSURE: 102 MMHG

## 2023-11-16 LAB
ALBUMIN SERPL ELPH-MCNC: 3.4 G/DL — SIGNIFICANT CHANGE UP (ref 3.3–5)
ALP SERPL-CCNC: 103 U/L — SIGNIFICANT CHANGE UP (ref 40–120)
ALT FLD-CCNC: 12 U/L — SIGNIFICANT CHANGE UP (ref 10–45)
ANION GAP SERPL CALC-SCNC: 9 MMOL/L — SIGNIFICANT CHANGE UP (ref 5–17)
AST SERPL-CCNC: 23 U/L — SIGNIFICANT CHANGE UP (ref 10–40)
BASOPHILS # BLD AUTO: 0.04 K/UL — SIGNIFICANT CHANGE UP (ref 0–0.2)
BASOPHILS NFR BLD AUTO: 0.4 % — SIGNIFICANT CHANGE UP (ref 0–2)
BILIRUB SERPL-MCNC: 0.6 MG/DL — SIGNIFICANT CHANGE UP (ref 0.2–1.2)
BUN SERPL-MCNC: 6 MG/DL — LOW (ref 7–23)
CALCIUM SERPL-MCNC: 9.2 MG/DL — SIGNIFICANT CHANGE UP (ref 8.4–10.5)
CHLORIDE SERPL-SCNC: 107 MMOL/L — SIGNIFICANT CHANGE UP (ref 96–108)
CO2 SERPL-SCNC: 22 MMOL/L — SIGNIFICANT CHANGE UP (ref 22–31)
CREAT SERPL-MCNC: 0.56 MG/DL — SIGNIFICANT CHANGE UP (ref 0.5–1.3)
EGFR: 119 ML/MIN/1.73M2 — SIGNIFICANT CHANGE UP
EOSINOPHIL # BLD AUTO: 0.09 K/UL — SIGNIFICANT CHANGE UP (ref 0–0.5)
EOSINOPHIL NFR BLD AUTO: 0.8 % — SIGNIFICANT CHANGE UP (ref 0–6)
GLUCOSE SERPL-MCNC: 89 MG/DL — SIGNIFICANT CHANGE UP (ref 70–99)
HCT VFR BLD CALC: 34.4 % — LOW (ref 34.5–45)
HGB BLD-MCNC: 11.6 G/DL — SIGNIFICANT CHANGE UP (ref 11.5–15.5)
IMM GRANULOCYTES NFR BLD AUTO: 1.7 % — HIGH (ref 0–0.9)
LYMPHOCYTES # BLD AUTO: 1.56 K/UL — SIGNIFICANT CHANGE UP (ref 1–3.3)
LYMPHOCYTES # BLD AUTO: 14.5 % — SIGNIFICANT CHANGE UP (ref 13–44)
MAGNESIUM SERPL-MCNC: 2 MG/DL — SIGNIFICANT CHANGE UP (ref 1.6–2.6)
MCHC RBC-ENTMCNC: 33 PG — SIGNIFICANT CHANGE UP (ref 27–34)
MCHC RBC-ENTMCNC: 33.7 GM/DL — SIGNIFICANT CHANGE UP (ref 32–36)
MCV RBC AUTO: 98 FL — SIGNIFICANT CHANGE UP (ref 80–100)
MONOCYTES # BLD AUTO: 1 K/UL — HIGH (ref 0–0.9)
MONOCYTES NFR BLD AUTO: 9.3 % — SIGNIFICANT CHANGE UP (ref 2–14)
NEUTROPHILS # BLD AUTO: 7.88 K/UL — HIGH (ref 1.8–7.4)
NEUTROPHILS NFR BLD AUTO: 73.3 % — SIGNIFICANT CHANGE UP (ref 43–77)
NRBC # BLD: 0 /100 WBCS — SIGNIFICANT CHANGE UP (ref 0–0)
PHOSPHATE SERPL-MCNC: 3.9 MG/DL — SIGNIFICANT CHANGE UP (ref 2.5–4.5)
PLATELET # BLD AUTO: 198 K/UL — SIGNIFICANT CHANGE UP (ref 150–400)
POTASSIUM SERPL-MCNC: 4.2 MMOL/L — SIGNIFICANT CHANGE UP (ref 3.5–5.3)
POTASSIUM SERPL-SCNC: 4.2 MMOL/L — SIGNIFICANT CHANGE UP (ref 3.5–5.3)
PROT SERPL-MCNC: 6.5 G/DL — SIGNIFICANT CHANGE UP (ref 6–8.3)
RBC # BLD: 3.51 M/UL — LOW (ref 3.8–5.2)
RBC # FLD: 13.7 % — SIGNIFICANT CHANGE UP (ref 10.3–14.5)
SODIUM SERPL-SCNC: 138 MMOL/L — SIGNIFICANT CHANGE UP (ref 135–145)
WBC # BLD: 10.75 K/UL — HIGH (ref 3.8–10.5)
WBC # FLD AUTO: 10.75 K/UL — HIGH (ref 3.8–10.5)

## 2023-11-16 PROCEDURE — 84443 ASSAY THYROID STIM HORMONE: CPT

## 2023-11-16 PROCEDURE — 83880 ASSAY OF NATRIURETIC PEPTIDE: CPT

## 2023-11-16 PROCEDURE — 80053 COMPREHEN METABOLIC PANEL: CPT

## 2023-11-16 PROCEDURE — 83735 ASSAY OF MAGNESIUM: CPT

## 2023-11-16 PROCEDURE — 85730 THROMBOPLASTIN TIME PARTIAL: CPT

## 2023-11-16 PROCEDURE — 93970 EXTREMITY STUDY: CPT | Mod: 26

## 2023-11-16 PROCEDURE — 82570 ASSAY OF URINE CREATININE: CPT

## 2023-11-16 PROCEDURE — 84156 ASSAY OF PROTEIN URINE: CPT

## 2023-11-16 PROCEDURE — 83835 ASSAY OF METANEPHRINES: CPT

## 2023-11-16 PROCEDURE — 93970 EXTREMITY STUDY: CPT

## 2023-11-16 PROCEDURE — 82550 ASSAY OF CK (CPK): CPT

## 2023-11-16 PROCEDURE — 84100 ASSAY OF PHOSPHORUS: CPT

## 2023-11-16 PROCEDURE — 84484 ASSAY OF TROPONIN QUANT: CPT

## 2023-11-16 PROCEDURE — 86850 RBC ANTIBODY SCREEN: CPT

## 2023-11-16 PROCEDURE — 93306 TTE W/DOPPLER COMPLETE: CPT

## 2023-11-16 PROCEDURE — 93306 TTE W/DOPPLER COMPLETE: CPT | Mod: 26

## 2023-11-16 PROCEDURE — 86900 BLOOD TYPING SEROLOGIC ABO: CPT

## 2023-11-16 PROCEDURE — 87086 URINE CULTURE/COLONY COUNT: CPT

## 2023-11-16 PROCEDURE — 85610 PROTHROMBIN TIME: CPT

## 2023-11-16 PROCEDURE — 85384 FIBRINOGEN ACTIVITY: CPT

## 2023-11-16 PROCEDURE — 83615 LACTATE (LD) (LDH) ENZYME: CPT

## 2023-11-16 PROCEDURE — 84550 ASSAY OF BLOOD/URIC ACID: CPT

## 2023-11-16 PROCEDURE — 82553 CREATINE MB FRACTION: CPT

## 2023-11-16 PROCEDURE — 93308 TTE F-UP OR LMTD: CPT

## 2023-11-16 PROCEDURE — 71045 X-RAY EXAM CHEST 1 VIEW: CPT

## 2023-11-16 PROCEDURE — 86901 BLOOD TYPING SEROLOGIC RH(D): CPT

## 2023-11-16 PROCEDURE — 85025 COMPLETE CBC W/AUTO DIFF WBC: CPT

## 2023-11-16 PROCEDURE — 36415 COLL VENOUS BLD VENIPUNCTURE: CPT

## 2023-11-16 PROCEDURE — 81001 URINALYSIS AUTO W/SCOPE: CPT

## 2023-11-16 RX ORDER — ASPIRIN/CALCIUM CARB/MAGNESIUM 324 MG
1 TABLET ORAL
Qty: 0 | Refills: 0 | DISCHARGE
Start: 2023-11-16

## 2023-11-16 RX ORDER — FERROUS SULFATE 325(65) MG
1 TABLET ORAL
Qty: 0 | Refills: 0 | DISCHARGE
Start: 2023-11-16

## 2023-11-16 RX ORDER — ASPIRIN/CALCIUM CARB/MAGNESIUM 324 MG
81 TABLET ORAL DAILY
Refills: 0 | Status: DISCONTINUED | OUTPATIENT
Start: 2023-11-16 | End: 2023-11-16

## 2023-11-16 RX ADMIN — Medication 1 TABLET(S): at 12:43

## 2023-11-16 RX ADMIN — Medication 325 MILLIGRAM(S): at 12:43

## 2023-11-16 RX ADMIN — Medication 81 MILLIGRAM(S): at 05:12

## 2023-11-16 NOTE — CHART NOTE - NSCHARTNOTEFT_GEN_A_CORE
11-16-23 @ 17:12    To Whom It May Concern:    On behalf of Ms. PETEY STEPHENS,    Please use this letter as verfication that the above patient was hospitalized at [facility] from 11/15/2023 to 11/16/2023. She should be excused from work 11/15/2023 through 11/20/2023.   If any questions or concerns please call [phone].       Best Regards,      Dr. Chapis Ram MD  [facility]  OB/GYN Department   [address] [place] [id number]  [phone] 11-16-23 @ 17:12    To Whom It May Concern:    On behalf of Ms. PETEY STEPHENS,    Please use this letter as verfication that the above patient was hospitalized at [facility] from 11/15/2023 to 11/16/2023. She should be excused from work 11/15/2023 through 11/20/2023.   If any questions or concerns please call [phone].       Best Regards,      Dr. Chapis Ram MD  [facility]  OB/GYN Department   [address] [place] [address]  [phone] 11-16-23 @ 17:12    To Whom It May Concern:    On behalf of Ms. PETEY STEPHENS,    Please use this letter as verfication that the above patient was hospitalized at [facility] from 11/15/2023 to 11/16/2023. She should be excused from work 11/15/2023 through 11/20/2023.   If any questions or concerns please call [phone].       Best Regards,      Dr. Chapis Ram MD  [facility]  OB/GYN Department   [address] [place] [address]  [phone]

## 2023-11-16 NOTE — PROGRESS NOTE ADULT - ASSESSMENT
38F  at 32 4/7 wga (DINORA 24) presents to OB service after being sent in by her MFM OB for tachycardia to 140s during her checkup. Cardiology consulted for evaluation of tachycardia.     Review of Studies:  ECG 11/15/23: ST at 117bpm with no evidence of RV strain, conduction disease, or acute ischemia   ECG 23: ST at 124 bpm  TTE 2023: The left ventricle is normal in size, wall thickness, and systolic function with a calculated ejection fraction of 55-60%. There are no regional wall motion abnormalities seen. There is normal left ventricular diastolic function and filling pressure.    Recommendations:    #Inappropriate Sinus Tachycardia  -pt reports long hx of tachycardia, reports she had a 24 hour monitor completed in , which was negative for arrhythmias at that time. On arrival to [facility] and overnight on tele monitoring, patient's HR was elevated from 100-130s  -pt is afebrile, thyroid levels WNL, cardiac enzymes unremarkable  -Notes her HR is typically higher when in the hospital or at the doctor's office. Suspect component of stress/anxiety in addition to underlying inappropriate ST, as well as ST suspected during pregnancy  -will plan for zio patch placement with EP on  for one week monitoring. After zio patch, will consider addition of BB  -pt denies any prolonged immobility or recent travel, no associated shortness of breath or hypoxia, however recommend obtaining b/l LE dopplers to r/o DVT  -no further cardiac intervention required inpatient at this time    Recommendations are not final until attending attestation  Please re-consult as needed.                 38F  at 32 4/7 wga (DINORA 23) presents to OB service after being sent in by her MFM OB for tachycardia to 140s during her checkup. Cardiology consulted for evaluation of tachycardia.     Review of Studies:  ECG 11/15/23: ST at 117bpm with no evidence of RV strain, conduction disease, or acute ischemia   ECG 23: ST at 124 bpm  TTE 2023: The left ventricle is normal in size, wall thickness, and systolic function with a calculated ejection fraction of 55-60%. There are no regional wall motion abnormalities seen. There is normal left ventricular diastolic function and filling pressure.    Recommendations:    #Inappropriate Sinus Tachycardia  -pt reports long hx of tachycardia, reports she had a 24 hour monitor completed in , which was negative for arrhythmias at that time. On arrival to [facility] and overnight on tele monitoring, patient's HR was elevated from 100-130s  -pt is afebrile, thyroid levels WNL, cardiac enzymes unremarkable  -Notes her HR is typically higher when in the hospital or at the doctor's office. Suspect component of stress/anxiety in addition to underlying inappropriate ST, as well as ST suspected during pregnancy  -will plan for zio patch placement with EP on  for one week monitoring. After zio patch, will consider addition of BB  -pt denies any prolonged immobility or recent travel, no associated shortness of breath or hypoxia, however recommend obtaining b/l LE dopplers to r/o DVT  -no further cardiac intervention required inpatient at this time    Recommendations are not final until attending attestation  Please re-consult as needed. 38F  at 32 4/7 wga (DINORA 23) presents to OB service after being sent in by her MFM OB for tachycardia to 140s during her checkup. Cardiology consulted for evaluation of tachycardia.     Review of Studies:  ECG 11/15/23: ST at 117bpm with no evidence of RV strain, conduction disease, or acute ischemia   ECG 23: ST at 124 bpm  TTE 2023: The left ventricle is normal in size, wall thickness, and systolic function with a calculated ejection fraction of 55-60%. There are no regional wall motion abnormalities seen. There is normal left ventricular diastolic function and filling pressure.    Recommendations:    #Inappropriate Sinus Tachycardia  -pt reports long hx of tachycardia, reports she had a 24 hour monitor completed in , which was negative for arrhythmias at that time. On arrival to [facility] and overnight on tele monitoring, patient's HR was elevated from 100-130s  -pt is afebrile, thyroid levels WNL, cardiac enzymes unremarkable  -Notes her HR is typically higher when in the hospital or at the doctor's office. Suspect component of stress/anxiety in addition to underlying inappropriate ST, as well as ST suspected during pregnancy  -will plan for zio patch placement with EP on  for 2 weeks monitoring. After 1 week will intiiate Toprol 25 mg po daily for pre/post therapy initiation  -pt denies any prolonged immobility or recent travel, no associated shortness of breath or hypoxia, however recommend obtaining b/l LE dopplers to r/o DVT  -no further cardiac intervention required inpatient at this time    Recommendations are not final until attending attestation  Please re-consult as needed.

## 2023-11-16 NOTE — PROGRESS NOTE ADULT - SUBJECTIVE AND OBJECTIVE BOX
OVERNIGHT EVENTS: -130s on tele monitor overnight    SUBJECTIVE:  Patient seen and examined at bedside. Denies fevers, chills, chest pain, palpitations, shortness of breath, cough, abd pain, n/v/d, dysuria/hematuria. Reports she has a long hx of tachycardia for "the past 10 years." States her HR at the doctors office often can range 110-110s, due to stress/nerves, but states at home her HR is in the 90s.     Vital Signs Last 12 Hrs  T(F): 98 (11-16-23 @ 14:00), Max: 98 (11-16-23 @ 06:00)  HR: 107 (11-16-23 @ 14:00) (107 - 118)  BP: 102/64 (11-16-23 @ 14:00) (102/64 - 116/71)  BP(mean): --  RR: 18 (11-16-23 @ 14:00) (18 - 18)  SpO2: 100% (11-16-23 @ 14:00) (98% - 100%)  I&O's Summary    15 Nov 2023 07:01  -  16 Nov 2023 07:00  --------------------------------------------------------  IN: 0 mL / OUT: 1750 mL / NET: -1750 mL    16 Nov 2023 07:01  -  16 Nov 2023 15:24  --------------------------------------------------------  IN: 0 mL / OUT: 900 mL / NET: -900 mL      PHYSICAL EXAM:  Constitutional: NAD, comfortable in bed. Speaking in full sentences  HEENT: MMM  Neck: Supple, no JVD  Respiratory: CTA B/L. No w/r/r.   Cardiovascular: regular, tachycardic, normal S1 and S2, no m/r/g.   Gastrointestinal: +BS, soft NTND, +gravid    Extremities: wwp; no cyanosis, clubbing or edema.   Vascular: Pulses equal and strong throughout.   Neurological: AAOx3      LABS:                        11.6   10.75 )-----------( 198      ( 16 Nov 2023 05:28 )             34.4     11-16    138  |  107  |  6<L>  ----------------------------<  89  4.2   |  22  |  0.56    Ca    9.2      16 Nov 2023 05:28  Phos  3.9     11-16  Mg     2.0     11-16    TPro  6.5  /  Alb  3.4  /  TBili  0.6  /  DBili  x   /  AST  23  /  ALT  12  /  AlkPhos  103  11-16    PT/INR - ( 15 Nov 2023 19:15 )   PT: 11.2 sec;   INR: 0.98          PTT - ( 15 Nov 2023 19:15 )  PTT:28.3 sec  Urinalysis Basic - ( 16 Nov 2023 05:28 )    Color: x / Appearance: x / SG: x / pH: x  Gluc: 89 mg/dL / Ketone: x  / Bili: x / Urobili: x   Blood: x / Protein: x / Nitrite: x   Leuk Esterase: x / RBC: x / WBC x   Sq Epi: x / Non Sq Epi: x / Bacteria: x      RADIOLOGY & ADDITIONAL TESTS:    MEDICATIONS  (STANDING):  aspirin  chewable 81 milliGRAM(s) Oral daily  ferrous    sulfate 325 milliGRAM(s) Oral daily  prenatal multivitamin 1 Tablet(s) Oral daily    MEDICATIONS  (PRN):

## 2023-11-16 NOTE — DISCHARGE NOTE ANTEPARTUM - PROVIDER TOKENS
FREE:[LAST:[[facility]],PHONE:[phone]],FAX:[(   )    -],ADDRESS:[[address] [place] [address]]],PROVIDER:[TOKEN:[[id number]]] FREE:[LAST:[[facility]],PHONE:[phone]],FAX:[(   )    -],ADDRESS:[[address] [place] [address]]],PROVIDER:[TOKEN:[[id number]]] FREE:[LAST:[[facility]],PHONE:[phone]],FAX:[(   )    -],ADDRESS:[[address] [place] [address]]],PROVIDER:[TOKEN:[[id number]]]

## 2023-11-16 NOTE — DISCHARGE NOTE ANTEPARTUM - CARE PROVIDERS DIRECT ADDRESSES
,DirectAddress_Unknown,[email or web address] ,DirectAddress_Unknown,[email or web address] ,DirectAddress_Unknown,[email or web address]

## 2023-11-16 NOTE — DISCHARGE NOTE ANTEPARTUM - CARE PROVIDER_API CALL
[facility],   [address] [place] [address]  Phone: [phone]  Fax: (   )    -  Follow Up Time:     Samantha Dimas  Cardiovascular Disease  [address] [place] [address]  Phone: [phone]  Fax: [phone]  Follow Up Time:    [facility],   [address] [place] [address]  Phone: [phone]  Fax: (   )    -  Follow Up Time:     Samantha Dimas  Cardiovascular Disease  [address] [place] [address]  Phone: [phone]  Fax: [phone]  Follow Up Time:    [facility],   [address] [place] [address]  Phone: [phone]  Fax: (   )    -  Follow Up Time:     Samantha Dimas  Cardiovascular Disease  [address] [place] [address]  Phone: [phone]  Fax: [phone]  Follow Up Time:

## 2023-11-16 NOTE — DISCHARGE NOTE ANTEPARTUM - HOSPITAL COURSE
39y/o  at 33+1 presenting from clinic for tachycardia, asymptomatic.  S/p cardiac work up with normal echo. Stable ekgs showing sinus tachycardia. Lab work wnl. Fetal status reassuring. Patient leared fro discharge by cardiology with 2 week follow up appointment to be made with Dr. Dimas.   37y/o  at 33+1 presenting from clinic for tachycardia, asymptomatic.  S/p cardiac work up with normal echo. Stable ekgs showing sinus tachycardia. Lab work wnl. Fetal status reassuring. Patient leared fro discharge by cardiology with 2 week follow up appointment to be made with Dr. Dimas.   37y/o  at 33+1 presenting from clinic for tachycardia, asymptomatic.  S/p cardiac work up with normal echo. Stable ekgs showing sinus tachycardia. Lab work wnl. Fetal status reassuring. Lower extremity dopplers wnl. Patient cleared for discharge by cardiology with 2 week follow up appointment to be made with Dr. Dimas and appointment with electrophysiology .  37y/o  at 33+1 presenting from clinic for tachycardia, asymptomatic.  S/p cardiac work up with normal echo. Stable ekgs showing sinus tachycardia. Lab work wnl. Fetal status reassuring. Lower extremity dopplers wnl. Patient cleared for discharge by cardiology with 2 week follow up appointment to be made with Dr. Dimas and appointment with electrophysiology  for zio patch placement. Zio patch will allow for 1 week of monitoring. Cardiology to decide on need for beta blockers after monitoring.

## 2023-11-16 NOTE — DISCHARGE NOTE ANTEPARTUM - MEDICATION SUMMARY - MEDICATIONS TO TAKE
I will START or STAY ON the medications listed below when I get home from the hospital:    aspirin 81 mg oral tablet, chewable  -- 1 tab(s) by mouth once a day  -- Indication: For preeclampsia ppx    Prenatal Multivitamins with Folic Acid 1 mg oral tablet  -- 1 tab(s) by mouth once a day  -- Indication: For pregnancy    ferrous sulfate 325 mg (65 mg elemental iron) oral tablet  -- 1 tab(s) by mouth once a day  -- Indication: For anemia

## 2023-11-16 NOTE — DISCHARGE NOTE ANTEPARTUM - NS MD DC FALL RISK RISK
For information on Fall & Injury Prevention, visit: [email or web address]/[email or web address]-[email or web address]-mobility OR  [email or web address]://www.[email or web address]/news/fall-prevention-tips-[email or web address]-avoid-injury OR  https://www.cdc.gov/steadi/patient.html For information on Fall & Injury Prevention, visit: [email or web address]://www.[facility]/news/fall-prevention-protects-and-maintains-health-and-mobility OR  https://www.[facility]/news/fall-prevention-tips-to-avoid-injury OR  https://www.cdc.gov/steadi/patient.html For information on Fall & Injury Prevention, visit: https://www.[facility]/news/fall-prevention-protects-and-maintains-health-and-mobility OR  https://www.[facility]/news/fall-prevention-tips-to-avoid-injury OR  https://www.cdc.gov/steadi/patient.html

## 2023-11-16 NOTE — DISCHARGE NOTE ANTEPARTUM - PATIENT PORTAL LINK FT
You can access the FollowMyHealth Patient Portal offered by [facility] by registering at the following website: [email or web address]. By joining [employer]’s FollowMyHealth portal, you will also be able to view your health information using other applications (apps) compatible with our system. You can access the FollowMyHealth Patient Portal offered by [facility] by registering at the following website: [email or web address]. By joining [employer]’s FollowMyHealth portal, you will also be able to view your health information using other applications (apps) compatible with our system. You can access the FollowMyHealth Patient Portal offered by [facility] by registering at the following website: [email or web address]. By joining [employer]’s FollowMyHealth portal, you will also be able to view your health information using other applications (apps) compatible with our system.

## 2023-11-16 NOTE — DISCHARGE NOTE ANTEPARTUM - CARE PLAN
Principal Discharge DX:	Tachycardia  Assessment and plan of treatment:	Follow up with Dr. Samantha Dimas within 2 weeks of discharge from hospital. Call [phone] to make appointment.  Secondary Diagnosis:	Intrauterine growth restriction,   Assessment and plan of treatment:	Continue to follow with prenatal care. Plan for delivery at 37 weeks. Continue with growth scans every 2-4 weeks.   Principal Discharge DX:	Tachycardia  Assessment and plan of treatment:	Follow up with Dr. Samantha Dimas within 2 weeks of discharge from hospital. Call [phone] to make appointment.  Secondary Diagnosis:	Intrauterine growth restriction,   Assessment and plan of treatment:	Continue to follow with prenatal care. Plan for delivery at 37 weeks. Continue with growth scans every 2-4 weeks.   Principal Discharge DX:	Tachycardia  Assessment and plan of treatment:	Follow up with Dr. Samantha Dimas within 2 weeks of discharge from hospital. Call [phone] to make appointment.  Secondary Diagnosis:	Intrauterine growth restriction,   Assessment and plan of treatment:	Continue to follow with prenatal care. Plan for delivery at 37 weeks. Continue with growth scans every 2-4 weeks.   1 Principal Discharge DX:	Tachycardia  Assessment and plan of treatment:	Follow up with Dr. Samantha Dimas within 2 weeks of discharge from hospital. Call [phone] to make appointment.  Follow with electrophysiology tomorrow, .  Secondary Diagnosis:	Intrauterine growth restriction,   Assessment and plan of treatment:	Continue to follow with prenatal care. Plan for delivery at 37 weeks. Continue with growth scans every 2-4 weeks.   Principal Discharge DX:	Tachycardia  Assessment and plan of treatment:	Follow up with Dr. Samantha Dimas within 2 weeks of discharge from hospital. Call [phone] to make appointment.  Follow with electrophysiology tomorrow, .  Secondary Diagnosis:	Intrauterine growth restriction,   Assessment and plan of treatment:	Continue to follow with prenatal care. Plan for delivery at 37 weeks. Continue with growth scans every 2-4 weeks.   Principal Discharge DX:	Tachycardia  Assessment and plan of treatment:	Follow up with Dr. Samantha Dimas within 2 weeks of discharge from hospital. Call [phone] to make appointment.  Follow with electrophysiology tomorrow, .  Secondary Diagnosis:	Intrauterine growth restriction,   Assessment and plan of treatment:	Continue to follow with prenatal care. Plan for delivery at 37 weeks. Continue with growth scans every 2-4 weeks.   Principal Discharge DX:	Tachycardia  Assessment and plan of treatment:	Follow up with Dr. Samantha Dimas within 2 weeks of discharge from hospital. Call [phone] to make appointment.  Follow with electrophysiology tomorrow, , for zio patch placement. Zio patch will allow for 1 week of monitoring. Cardiology to decide on need for beta blockers after monitoring.  Secondary Diagnosis:	Intrauterine growth restriction,   Assessment and plan of treatment:	Continue to follow with prenatal care. Plan for delivery at 37 weeks. Continue with growth scans every 2-4 weeks.   Principal Discharge DX:	Tachycardia  Assessment and plan of treatment:	Follow up with Dr. Samantha Dimas within 2 weeks of discharge from hospital. Call [phone] to make appointment.  Follow with electrophysiology tomorrow, , for zio patch placement. Zio patch will allow for 1 week of monitoring. Cardiology to decide on need for beta blockers after monitoring.  Secondary Diagnosis:	Intrauterine growth restriction,   Assessment and plan of treatment:	Continue to follow with prenatal care. Plan for delivery at 37 weeks. Continue with growth scans every 2-4 weeks.   Principal Discharge DX:	Tachycardia  Assessment and plan of treatment:	Follow up with Dr. Samantha Dimas within 2 weeks of discharge from hospital. Call [phone] to make appointment.  Follow with electrophysiology tomorrow, , for zio patch placement. Zio patch will allow for 1 week of monitoring. Cardiology to decide on need for beta blockers after monitoring.  Secondary Diagnosis:	Intrauterine growth restriction,   Assessment and plan of treatment:	Continue to follow with prenatal care. Plan for delivery at 37 weeks. Continue with growth scans every 2-4 weeks.

## 2023-11-16 NOTE — DISCHARGE NOTE ANTEPARTUM - PLAN OF CARE
Follow up with Dr. Samantha Dimas within 2 weeks of discharge from hospital. Call [phone] to make appointment. Follow up with Dr. Samantha Dimas within 2 weeks of discharge from hospital. Call [phone] to make appointment. Follow up with Dr. Samantha Dimas within 2 weeks of discharge from hospital. Call [phone] to make appointment. Continue to follow with prenatal care. Plan for delivery at 37 weeks. Continue with growth scans every 2-4 weeks. Follow up with Dr. Samantha Dimas within 2 weeks of discharge from hospital. Call [phone] to make appointment.  Follow with electrophysiology tomorrow, 11/17. Follow up with Dr. Samantha Dimas within 2 weeks of discharge from hospital. Call [phone] to make appointment.  Follow with electrophysiology tomorrow, 11/17. Follow up with Dr. Samantha Dimas within 2 weeks of discharge from hospital. Call [phone] to make appointment.  Follow with electrophysiology tomorrow, 11/17. Follow up with Dr. Samantha Dimas within 2 weeks of discharge from hospital. Call [phone] to make appointment.  Follow with electrophysiology tomorrow, 11/17, for zio patch placement. Zio patch will allow for 1 week of monitoring. Cardiology to decide on need for beta blockers after monitoring. Follow up with Dr. Samantha Dimas within 2 weeks of discharge from hospital. Call [phone] to make appointment.  Follow with electrophysiology tomorrow, 11/17, for zio patch placement. Zio patch will allow for 1 week of monitoring. Cardiology to decide on need for beta blockers after monitoring. Follow up with Dr. Samantha Dimas within 2 weeks of discharge from hospital. Call [phone] to make appointment.  Follow with electrophysiology tomorrow, 11/17, for zio patch placement. Zio patch will allow for 1 week of monitoring. Cardiology to decide on need for beta blockers after monitoring.

## 2023-11-17 ENCOUNTER — APPOINTMENT (OUTPATIENT)
Dept: HEART AND VASCULAR | Facility: CLINIC | Age: 38
End: 2023-11-17
Payer: MEDICAID

## 2023-11-17 ENCOUNTER — OUTPATIENT (OUTPATIENT)
Dept: OUTPATIENT SERVICES | Facility: HOSPITAL | Age: 38
LOS: 1 days | End: 2023-11-17
Payer: COMMERCIAL

## 2023-11-17 DIAGNOSIS — O26.899 OTHER SPECIFIED PREGNANCY RELATED CONDITIONS, UNSPECIFIED TRIMESTER: ICD-10-CM

## 2023-11-17 LAB
COLLECT DURATION TIME UR: 24 HR — SIGNIFICANT CHANGE UP
CULTURE RESULTS: SIGNIFICANT CHANGE UP
PROT 24H UR-MRATE: 275 MG/24 H — HIGH (ref 50–100)
SPECIMEN SOURCE: SIGNIFICANT CHANGE UP
TOTAL VOLUME - 24 HOUR: 2500 ML — SIGNIFICANT CHANGE UP
URINE CREATININE CALCULATION: 1.2 G/24 H — SIGNIFICANT CHANGE UP (ref 0.8–1.8)

## 2023-11-17 PROCEDURE — 99214 OFFICE O/P EST MOD 30 MIN: CPT

## 2023-11-17 PROCEDURE — 84156 ASSAY OF PROTEIN URINE: CPT

## 2023-11-17 PROCEDURE — 93246 EXT ECG>7D<15D RECORDING: CPT

## 2023-11-20 ENCOUNTER — ASOB RESULT (OUTPATIENT)
Age: 38
End: 2023-11-20

## 2023-11-20 ENCOUNTER — APPOINTMENT (OUTPATIENT)
Dept: ANTEPARTUM | Facility: CLINIC | Age: 38
End: 2023-11-20
Payer: MEDICAID

## 2023-11-20 DIAGNOSIS — O34.13 MATERNAL CARE FOR BENIGN TUMOR OF CORPUS UTERI, THIRD TRIMESTER: ICD-10-CM

## 2023-11-20 DIAGNOSIS — O09.523 SUPERVISION OF ELDERLY MULTIGRAVIDA, THIRD TRIMESTER: ICD-10-CM

## 2023-11-20 DIAGNOSIS — Z3A.28 28 WEEKS GESTATION OF PREGNANCY: ICD-10-CM

## 2023-11-20 DIAGNOSIS — O09.293 SUPERVISION OF PREGNANCY WITH OTHER POOR REPRODUCTIVE OR OBSTETRIC HISTORY, THIRD TRIMESTER: ICD-10-CM

## 2023-11-20 DIAGNOSIS — R10.30 LOWER ABDOMINAL PAIN, UNSPECIFIED: ICD-10-CM

## 2023-11-20 DIAGNOSIS — O26.893 OTHER SPECIFIED PREGNANCY RELATED CONDITIONS, THIRD TRIMESTER: ICD-10-CM

## 2023-11-20 DIAGNOSIS — D25.9 LEIOMYOMA OF UTERUS, UNSPECIFIED: ICD-10-CM

## 2023-11-20 DIAGNOSIS — D64.9 ANEMIA, UNSPECIFIED: ICD-10-CM

## 2023-11-20 DIAGNOSIS — O99.013 ANEMIA COMPLICATING PREGNANCY, THIRD TRIMESTER: ICD-10-CM

## 2023-11-20 PROCEDURE — 76821 MIDDLE CEREBRAL ARTERY ECHO: CPT | Mod: 59

## 2023-11-20 PROCEDURE — 76818 FETAL BIOPHYS PROFILE W/NST: CPT

## 2023-11-20 PROCEDURE — 76816 OB US FOLLOW-UP PER FETUS: CPT

## 2023-11-20 PROCEDURE — 76820 UMBILICAL ARTERY ECHO: CPT | Mod: 59

## 2023-11-21 DIAGNOSIS — R00.0 TACHYCARDIA, UNSPECIFIED: ICD-10-CM

## 2023-11-21 DIAGNOSIS — Z28.21 IMMUNIZATION NOT CARRIED OUT BECAUSE OF PATIENT REFUSAL: ICD-10-CM

## 2023-11-21 DIAGNOSIS — D50.9 IRON DEFICIENCY ANEMIA, UNSPECIFIED: ICD-10-CM

## 2023-11-21 DIAGNOSIS — O99.013 ANEMIA COMPLICATING PREGNANCY, THIRD TRIMESTER: ICD-10-CM

## 2023-11-21 DIAGNOSIS — O36.5930 MATERNAL CARE FOR OTHER KNOWN OR SUSPECTED POOR FETAL GROWTH, THIRD TRIMESTER, NOT APPLICABLE OR UNSPECIFIED: ICD-10-CM

## 2023-11-21 DIAGNOSIS — O99.891 OTHER SPECIFIED DISEASES AND CONDITIONS COMPLICATING PREGNANCY: ICD-10-CM

## 2023-11-21 DIAGNOSIS — Z28.09 IMMUNIZATION NOT CARRIED OUT BECAUSE OF OTHER CONTRAINDICATION: ICD-10-CM

## 2023-11-21 DIAGNOSIS — Z3A.32 32 WEEKS GESTATION OF PREGNANCY: ICD-10-CM

## 2023-11-30 ENCOUNTER — NON-APPOINTMENT (OUTPATIENT)
Age: 38
End: 2023-11-30

## 2023-11-30 ENCOUNTER — APPOINTMENT (OUTPATIENT)
Dept: OBGYN | Facility: CLINIC | Age: 38
End: 2023-11-30
Payer: MEDICAID

## 2023-11-30 VITALS
OXYGEN SATURATION: 99 % | WEIGHT: 153 LBS | BODY MASS INDEX: 25.46 KG/M2 | SYSTOLIC BLOOD PRESSURE: 110 MMHG | DIASTOLIC BLOOD PRESSURE: 70 MMHG

## 2023-11-30 DIAGNOSIS — Z34.90 ENCOUNTER FOR SUPERVISION OF NORMAL PREGNANCY, UNSPECIFIED, UNSPECIFIED TRIMESTER: ICD-10-CM

## 2023-11-30 LAB
METANEPHRINE, PL: 38.2 PG/ML — SIGNIFICANT CHANGE UP (ref 0–88)
NORMETANEPHRINE, PL: 122.6 PG/ML — SIGNIFICANT CHANGE UP (ref 0–210.1)

## 2023-11-30 PROCEDURE — 0502F SUBSEQUENT PRENATAL CARE: CPT

## 2023-12-01 ENCOUNTER — APPOINTMENT (OUTPATIENT)
Dept: HEART AND VASCULAR | Facility: CLINIC | Age: 38
End: 2023-12-01
Payer: MEDICAID

## 2023-12-01 PROCEDURE — 93248 EXT ECG>7D<15D REV&INTERPJ: CPT

## 2023-12-04 ENCOUNTER — ASOB RESULT (OUTPATIENT)
Age: 38
End: 2023-12-04

## 2023-12-04 ENCOUNTER — NON-APPOINTMENT (OUTPATIENT)
Age: 38
End: 2023-12-04

## 2023-12-04 ENCOUNTER — APPOINTMENT (OUTPATIENT)
Dept: ANTEPARTUM | Facility: CLINIC | Age: 38
End: 2023-12-04
Payer: MEDICAID

## 2023-12-04 PROCEDURE — 76818 FETAL BIOPHYS PROFILE W/NST: CPT

## 2023-12-04 PROCEDURE — 76816 OB US FOLLOW-UP PER FETUS: CPT

## 2023-12-04 PROCEDURE — 76821 MIDDLE CEREBRAL ARTERY ECHO: CPT | Mod: 59

## 2023-12-04 PROCEDURE — 76820 UMBILICAL ARTERY ECHO: CPT | Mod: 59

## 2023-12-14 ENCOUNTER — NON-APPOINTMENT (OUTPATIENT)
Age: 38
End: 2023-12-14

## 2023-12-15 ENCOUNTER — APPOINTMENT (OUTPATIENT)
Dept: OBGYN | Facility: CLINIC | Age: 38
End: 2023-12-15
Payer: MEDICAID

## 2023-12-15 ENCOUNTER — OUTPATIENT (OUTPATIENT)
Dept: OUTPATIENT SERVICES | Facility: HOSPITAL | Age: 38
LOS: 1 days | End: 2023-12-15
Payer: COMMERCIAL

## 2023-12-15 ENCOUNTER — NON-APPOINTMENT (OUTPATIENT)
Age: 38
End: 2023-12-15

## 2023-12-15 VITALS
OXYGEN SATURATION: 99 % | WEIGHT: 153 LBS | BODY MASS INDEX: 25.46 KG/M2 | DIASTOLIC BLOOD PRESSURE: 80 MMHG | HEART RATE: 99 BPM | SYSTOLIC BLOOD PRESSURE: 140 MMHG

## 2023-12-15 VITALS
TEMPERATURE: 98 F | HEART RATE: 106 BPM | DIASTOLIC BLOOD PRESSURE: 68 MMHG | SYSTOLIC BLOOD PRESSURE: 107 MMHG | RESPIRATION RATE: 16 BRPM

## 2023-12-15 DIAGNOSIS — R03.0 ELEVATED BLOOD-PRESSURE READING, W/OUT DIAGNOSIS OF HYPERTENSION: ICD-10-CM

## 2023-12-15 DIAGNOSIS — O26.899 OTHER SPECIFIED PREGNANCY RELATED CONDITIONS, UNSPECIFIED TRIMESTER: ICD-10-CM

## 2023-12-15 LAB
ALBUMIN SERPL ELPH-MCNC: 3.4 G/DL — SIGNIFICANT CHANGE UP (ref 3.3–5)
ALBUMIN SERPL ELPH-MCNC: 3.4 G/DL — SIGNIFICANT CHANGE UP (ref 3.3–5)
ALP SERPL-CCNC: 137 U/L — HIGH (ref 40–120)
ALP SERPL-CCNC: 137 U/L — HIGH (ref 40–120)
ALT FLD-CCNC: 13 U/L — SIGNIFICANT CHANGE UP (ref 10–45)
ALT FLD-CCNC: 13 U/L — SIGNIFICANT CHANGE UP (ref 10–45)
ANION GAP SERPL CALC-SCNC: 8 MMOL/L — SIGNIFICANT CHANGE UP (ref 5–17)
ANION GAP SERPL CALC-SCNC: 8 MMOL/L — SIGNIFICANT CHANGE UP (ref 5–17)
APTT BLD: 29.5 SEC — SIGNIFICANT CHANGE UP (ref 24.5–35.6)
APTT BLD: 29.5 SEC — SIGNIFICANT CHANGE UP (ref 24.5–35.6)
AST SERPL-CCNC: 25 U/L — SIGNIFICANT CHANGE UP (ref 10–40)
AST SERPL-CCNC: 25 U/L — SIGNIFICANT CHANGE UP (ref 10–40)
BASOPHILS # BLD AUTO: 0.04 K/UL — SIGNIFICANT CHANGE UP (ref 0–0.2)
BASOPHILS # BLD AUTO: 0.04 K/UL — SIGNIFICANT CHANGE UP (ref 0–0.2)
BASOPHILS NFR BLD AUTO: 0.4 % — SIGNIFICANT CHANGE UP (ref 0–2)
BASOPHILS NFR BLD AUTO: 0.4 % — SIGNIFICANT CHANGE UP (ref 0–2)
BILIRUB SERPL-MCNC: 0.8 MG/DL — SIGNIFICANT CHANGE UP (ref 0.2–1.2)
BILIRUB SERPL-MCNC: 0.8 MG/DL — SIGNIFICANT CHANGE UP (ref 0.2–1.2)
BUN SERPL-MCNC: 6 MG/DL — LOW (ref 7–23)
BUN SERPL-MCNC: 6 MG/DL — LOW (ref 7–23)
CALCIUM SERPL-MCNC: 9.4 MG/DL — SIGNIFICANT CHANGE UP (ref 8.4–10.5)
CALCIUM SERPL-MCNC: 9.4 MG/DL — SIGNIFICANT CHANGE UP (ref 8.4–10.5)
CHLORIDE SERPL-SCNC: 106 MMOL/L — SIGNIFICANT CHANGE UP (ref 96–108)
CHLORIDE SERPL-SCNC: 106 MMOL/L — SIGNIFICANT CHANGE UP (ref 96–108)
CO2 SERPL-SCNC: 22 MMOL/L — SIGNIFICANT CHANGE UP (ref 22–31)
CO2 SERPL-SCNC: 22 MMOL/L — SIGNIFICANT CHANGE UP (ref 22–31)
CREAT ?TM UR-MCNC: 44 MG/DL — SIGNIFICANT CHANGE UP
CREAT ?TM UR-MCNC: 44 MG/DL — SIGNIFICANT CHANGE UP
CREAT SERPL-MCNC: 0.56 MG/DL — SIGNIFICANT CHANGE UP (ref 0.5–1.3)
CREAT SERPL-MCNC: 0.56 MG/DL — SIGNIFICANT CHANGE UP (ref 0.5–1.3)
EGFR: 119 ML/MIN/1.73M2 — SIGNIFICANT CHANGE UP
EGFR: 119 ML/MIN/1.73M2 — SIGNIFICANT CHANGE UP
EOSINOPHIL # BLD AUTO: 0.03 K/UL — SIGNIFICANT CHANGE UP (ref 0–0.5)
EOSINOPHIL # BLD AUTO: 0.03 K/UL — SIGNIFICANT CHANGE UP (ref 0–0.5)
EOSINOPHIL NFR BLD AUTO: 0.3 % — SIGNIFICANT CHANGE UP (ref 0–6)
EOSINOPHIL NFR BLD AUTO: 0.3 % — SIGNIFICANT CHANGE UP (ref 0–6)
FIBRINOGEN PPP-MCNC: 381 MG/DL — SIGNIFICANT CHANGE UP (ref 200–445)
FIBRINOGEN PPP-MCNC: 381 MG/DL — SIGNIFICANT CHANGE UP (ref 200–445)
GLUCOSE SERPL-MCNC: 89 MG/DL — SIGNIFICANT CHANGE UP (ref 70–99)
GLUCOSE SERPL-MCNC: 89 MG/DL — SIGNIFICANT CHANGE UP (ref 70–99)
HCT VFR BLD CALC: 34.8 % — SIGNIFICANT CHANGE UP (ref 34.5–45)
HCT VFR BLD CALC: 34.8 % — SIGNIFICANT CHANGE UP (ref 34.5–45)
HGB BLD-MCNC: 11.9 G/DL — SIGNIFICANT CHANGE UP (ref 11.5–15.5)
HGB BLD-MCNC: 11.9 G/DL — SIGNIFICANT CHANGE UP (ref 11.5–15.5)
IMM GRANULOCYTES NFR BLD AUTO: 1.3 % — HIGH (ref 0–0.9)
IMM GRANULOCYTES NFR BLD AUTO: 1.3 % — HIGH (ref 0–0.9)
INR BLD: 0.98 — SIGNIFICANT CHANGE UP (ref 0.85–1.18)
INR BLD: 0.98 — SIGNIFICANT CHANGE UP (ref 0.85–1.18)
LDH SERPL L TO P-CCNC: 204 U/L — SIGNIFICANT CHANGE UP (ref 50–242)
LDH SERPL L TO P-CCNC: 204 U/L — SIGNIFICANT CHANGE UP (ref 50–242)
LYMPHOCYTES # BLD AUTO: 1.2 K/UL — SIGNIFICANT CHANGE UP (ref 1–3.3)
LYMPHOCYTES # BLD AUTO: 1.2 K/UL — SIGNIFICANT CHANGE UP (ref 1–3.3)
LYMPHOCYTES # BLD AUTO: 12.2 % — LOW (ref 13–44)
LYMPHOCYTES # BLD AUTO: 12.2 % — LOW (ref 13–44)
MCHC RBC-ENTMCNC: 33.4 PG — SIGNIFICANT CHANGE UP (ref 27–34)
MCHC RBC-ENTMCNC: 33.4 PG — SIGNIFICANT CHANGE UP (ref 27–34)
MCHC RBC-ENTMCNC: 34.2 GM/DL — SIGNIFICANT CHANGE UP (ref 32–36)
MCHC RBC-ENTMCNC: 34.2 GM/DL — SIGNIFICANT CHANGE UP (ref 32–36)
MCV RBC AUTO: 97.8 FL — SIGNIFICANT CHANGE UP (ref 80–100)
MCV RBC AUTO: 97.8 FL — SIGNIFICANT CHANGE UP (ref 80–100)
MONOCYTES # BLD AUTO: 0.77 K/UL — SIGNIFICANT CHANGE UP (ref 0–0.9)
MONOCYTES # BLD AUTO: 0.77 K/UL — SIGNIFICANT CHANGE UP (ref 0–0.9)
MONOCYTES NFR BLD AUTO: 7.8 % — SIGNIFICANT CHANGE UP (ref 2–14)
MONOCYTES NFR BLD AUTO: 7.8 % — SIGNIFICANT CHANGE UP (ref 2–14)
NEUTROPHILS # BLD AUTO: 7.69 K/UL — HIGH (ref 1.8–7.4)
NEUTROPHILS # BLD AUTO: 7.69 K/UL — HIGH (ref 1.8–7.4)
NEUTROPHILS NFR BLD AUTO: 78 % — HIGH (ref 43–77)
NEUTROPHILS NFR BLD AUTO: 78 % — HIGH (ref 43–77)
NRBC # BLD: 0 /100 WBCS — SIGNIFICANT CHANGE UP (ref 0–0)
NRBC # BLD: 0 /100 WBCS — SIGNIFICANT CHANGE UP (ref 0–0)
PLATELET # BLD AUTO: 206 K/UL — SIGNIFICANT CHANGE UP (ref 150–400)
PLATELET # BLD AUTO: 206 K/UL — SIGNIFICANT CHANGE UP (ref 150–400)
POTASSIUM SERPL-MCNC: 3.9 MMOL/L — SIGNIFICANT CHANGE UP (ref 3.5–5.3)
POTASSIUM SERPL-MCNC: 3.9 MMOL/L — SIGNIFICANT CHANGE UP (ref 3.5–5.3)
POTASSIUM SERPL-SCNC: 3.9 MMOL/L — SIGNIFICANT CHANGE UP (ref 3.5–5.3)
POTASSIUM SERPL-SCNC: 3.9 MMOL/L — SIGNIFICANT CHANGE UP (ref 3.5–5.3)
PROT ?TM UR-MCNC: 7 MG/DL — SIGNIFICANT CHANGE UP (ref 0–12)
PROT ?TM UR-MCNC: 7 MG/DL — SIGNIFICANT CHANGE UP (ref 0–12)
PROT SERPL-MCNC: 6.4 G/DL — SIGNIFICANT CHANGE UP (ref 6–8.3)
PROT SERPL-MCNC: 6.4 G/DL — SIGNIFICANT CHANGE UP (ref 6–8.3)
PROT/CREAT UR-RTO: 0.2 RATIO — SIGNIFICANT CHANGE UP (ref 0–0.2)
PROT/CREAT UR-RTO: 0.2 RATIO — SIGNIFICANT CHANGE UP (ref 0–0.2)
PROTHROM AB SERPL-ACNC: 11.2 SEC — SIGNIFICANT CHANGE UP (ref 9.5–13)
PROTHROM AB SERPL-ACNC: 11.2 SEC — SIGNIFICANT CHANGE UP (ref 9.5–13)
RBC # BLD: 3.56 M/UL — LOW (ref 3.8–5.2)
RBC # BLD: 3.56 M/UL — LOW (ref 3.8–5.2)
RBC # FLD: 13.6 % — SIGNIFICANT CHANGE UP (ref 10.3–14.5)
RBC # FLD: 13.6 % — SIGNIFICANT CHANGE UP (ref 10.3–14.5)
SODIUM SERPL-SCNC: 136 MMOL/L — SIGNIFICANT CHANGE UP (ref 135–145)
SODIUM SERPL-SCNC: 136 MMOL/L — SIGNIFICANT CHANGE UP (ref 135–145)
URATE SERPL-MCNC: 4.3 MG/DL — SIGNIFICANT CHANGE UP (ref 2.5–7)
URATE SERPL-MCNC: 4.3 MG/DL — SIGNIFICANT CHANGE UP (ref 2.5–7)
WBC # BLD: 9.86 K/UL — SIGNIFICANT CHANGE UP (ref 3.8–10.5)
WBC # BLD: 9.86 K/UL — SIGNIFICANT CHANGE UP (ref 3.8–10.5)
WBC # FLD AUTO: 9.86 K/UL — SIGNIFICANT CHANGE UP (ref 3.8–10.5)
WBC # FLD AUTO: 9.86 K/UL — SIGNIFICANT CHANGE UP (ref 3.8–10.5)

## 2023-12-15 PROCEDURE — 80053 COMPREHEN METABOLIC PANEL: CPT

## 2023-12-15 PROCEDURE — 36415 COLL VENOUS BLD VENIPUNCTURE: CPT

## 2023-12-15 PROCEDURE — 85610 PROTHROMBIN TIME: CPT

## 2023-12-15 PROCEDURE — 99214 OFFICE O/P EST MOD 30 MIN: CPT

## 2023-12-15 PROCEDURE — 59025 FETAL NON-STRESS TEST: CPT

## 2023-12-15 PROCEDURE — 83615 LACTATE (LD) (LDH) ENZYME: CPT

## 2023-12-15 PROCEDURE — 82570 ASSAY OF URINE CREATININE: CPT

## 2023-12-15 PROCEDURE — 84550 ASSAY OF BLOOD/URIC ACID: CPT

## 2023-12-15 PROCEDURE — 85025 COMPLETE CBC W/AUTO DIFF WBC: CPT

## 2023-12-15 PROCEDURE — 85384 FIBRINOGEN ACTIVITY: CPT

## 2023-12-15 PROCEDURE — 0502F SUBSEQUENT PRENATAL CARE: CPT

## 2023-12-15 PROCEDURE — 85730 THROMBOPLASTIN TIME PARTIAL: CPT

## 2023-12-15 PROCEDURE — 84156 ASSAY OF PROTEIN URINE: CPT

## 2023-12-15 NOTE — OB PROVIDER TRIAGE NOTE - NSOBPROVIDERNOTE_OBGYN_ALL_OB_FT
Subjective:  HPI: 38y yo Female  at 36w5d presents for evaluation of elevated BPs. Patient was seen in the resident office today with systolic , then she went home and self-monitored her BP with diastolics in the 90s. She denies headache, changes in vision, RUQ pain. Pregnancy was complicated by an antepartum admission for palpitations, where she subsequently underwent cardiology follow up and will undergo holter monitoring soon per patient.  - Fetal movement: endorses  - Contractions: denies  - Leakage of fluid: denies  - Vaginal bleeding: denies    Antepartum:  - Pregnancy type: spontaneous  - Testing: NIPT wnl  - Anatomy scan: wnl  - GCT/GTT: passed  - Hypertensive disorders of pregnancy: gHTN   - GBS status: unknown  - EFW: 2500g    - ObHx: G1 MAB DC 2020  - GynHx: Fibroid x1  - PMH: denies  - PSH: denies  - SH: denies toxic habits  - Meds: Prenatal vitamins  - Allergies: No Known Allergies        Objective:  T(C): 36.6 (12-15-23 @ 17:03), Max: 36.6 (12-15-23 @ 17:03)  HR: 106 (12-15-23 @ 17:03) (106 - 106)  BP: 107/68 (12-15-23 @ 17:03) (107/68 - 107/68)  RR: 16 (12-15-23 @ 17:03) (16 - 16)  SpO2: --  General: No apparent distress; Lying comfortably in bed  Pulmonary: No increased work of breathing  Abdomen: Soft; Nontender; Gravid  Extremities: Trace pedal edema bilaterally; No calf tenderness bilaterally  Neurological: Gross movements intact  Psychiatric: Appropriate mood and affect  Skin: No rashes or jaundice    TAUS: cephalic fetus; BPP 8/8; DOROTA 6.9cm    NST: reactive and reassuring  TOCO: uterine irritability          Assessment:  38  at 36w5d presents for evaluation of elevated BPs; Maternal and fetal statuses reassuring. Labs ordered and resulted wnl for gestational age, notably urine P/C 0.2. Patient not yet meeting criteria for PEC at this time but still indicated for scheduled delivery 37w0d to 37w6d.    Plan:  - Discharge home in stable condition  - Discharge with strict  labor and preeclampsia precautions  - Patient to collect 24-hr urine protein and monitor BPs at home  - Will follow up with patient next week to schedule delivery  - All questions were answered and concerns addressed. Patient verbally expressed understanding  - Discussed with attending physician Dr. Castillo

## 2023-12-15 NOTE — OB RN TRIAGE NOTE - FALL HARM RISK - UNIVERSAL INTERVENTIONS
Bed in lowest position, wheels locked, appropriate side rails in place/Call bell, personal items and telephone in reach/Instruct patient to call for assistance before getting out of bed or chair/Non-slip footwear when patient is out of bed/[place] to call system/Physically safe environment - no spills, clutter or unnecessary equipment/Purposeful Proactive Rounding/Room/bathroom lighting operational, light cord in reach Bed in lowest position, wheels locked, appropriate side rails in place/Call bell, personal items and telephone in reach/Instruct patient to call for assistance before getting out of bed or chair/Non-slip footwear when patient is out of bed/[place] to call system/Physically safe environment - no spills, clutter or unnecessary equipment/Purposeful Proactive Rounding/Room/bathroom lighting operational, light cord in reach

## 2023-12-18 ENCOUNTER — NON-APPOINTMENT (OUTPATIENT)
Age: 38
End: 2023-12-18

## 2023-12-18 ENCOUNTER — TRANSCRIPTION ENCOUNTER (OUTPATIENT)
Age: 38
End: 2023-12-18

## 2023-12-18 ENCOUNTER — ASOB RESULT (OUTPATIENT)
Age: 38
End: 2023-12-18

## 2023-12-18 ENCOUNTER — APPOINTMENT (OUTPATIENT)
Dept: ANTEPARTUM | Facility: CLINIC | Age: 38
End: 2023-12-18
Payer: MEDICAID

## 2023-12-18 ENCOUNTER — INPATIENT (INPATIENT)
Facility: HOSPITAL | Age: 38
LOS: 3 days | Discharge: HOME | End: 2023-12-22
Attending: OBSTETRICS & GYNECOLOGY | Admitting: OBSTETRICS & GYNECOLOGY
Payer: COMMERCIAL

## 2023-12-18 VITALS
OXYGEN SATURATION: 100 % | HEART RATE: 117 BPM | SYSTOLIC BLOOD PRESSURE: 118 MMHG | TEMPERATURE: 98 F | DIASTOLIC BLOOD PRESSURE: 79 MMHG | RESPIRATION RATE: 20 BRPM

## 2023-12-18 DIAGNOSIS — O09.523 SUPERVISION OF ELDERLY MULTIGRAVIDA, THIRD TRIMESTER: ICD-10-CM

## 2023-12-18 DIAGNOSIS — O13.3 GESTATIONAL [PREGNANCY-INDUCED] HYPERTENSION WITHOUT SIGNIFICANT PROTEINURIA, THIRD TRIMESTER: ICD-10-CM

## 2023-12-18 DIAGNOSIS — D21.9 BENIGN NEOPLASM OF CONNECTIVE AND OTHER SOFT TISSUE, UNSPECIFIED: ICD-10-CM

## 2023-12-18 DIAGNOSIS — O99.891 OTHER SPECIFIED DISEASES AND CONDITIONS COMPLICATING PREGNANCY: ICD-10-CM

## 2023-12-18 DIAGNOSIS — Z3A.37 37 WEEKS GESTATION OF PREGNANCY: ICD-10-CM

## 2023-12-18 DIAGNOSIS — O26.899 OTHER SPECIFIED PREGNANCY RELATED CONDITIONS, UNSPECIFIED TRIMESTER: ICD-10-CM

## 2023-12-18 LAB
ALBUMIN SERPL ELPH-MCNC: 3.5 G/DL — SIGNIFICANT CHANGE UP (ref 3.3–5)
ALBUMIN SERPL ELPH-MCNC: 3.5 G/DL — SIGNIFICANT CHANGE UP (ref 3.3–5)
ALP SERPL-CCNC: 140 U/L — HIGH (ref 40–120)
ALP SERPL-CCNC: 140 U/L — HIGH (ref 40–120)
ALT FLD-CCNC: 12 U/L — SIGNIFICANT CHANGE UP (ref 10–45)
ALT FLD-CCNC: 12 U/L — SIGNIFICANT CHANGE UP (ref 10–45)
ANION GAP SERPL CALC-SCNC: 10 MMOL/L — SIGNIFICANT CHANGE UP (ref 5–17)
ANION GAP SERPL CALC-SCNC: 10 MMOL/L — SIGNIFICANT CHANGE UP (ref 5–17)
AST SERPL-CCNC: 22 U/L — SIGNIFICANT CHANGE UP (ref 10–40)
AST SERPL-CCNC: 22 U/L — SIGNIFICANT CHANGE UP (ref 10–40)
BASOPHILS # BLD AUTO: 0.03 K/UL — SIGNIFICANT CHANGE UP (ref 0–0.2)
BASOPHILS # BLD AUTO: 0.03 K/UL — SIGNIFICANT CHANGE UP (ref 0–0.2)
BASOPHILS NFR BLD AUTO: 0.3 % — SIGNIFICANT CHANGE UP (ref 0–2)
BASOPHILS NFR BLD AUTO: 0.3 % — SIGNIFICANT CHANGE UP (ref 0–2)
BILIRUB SERPL-MCNC: 0.8 MG/DL — SIGNIFICANT CHANGE UP (ref 0.2–1.2)
BILIRUB SERPL-MCNC: 0.8 MG/DL — SIGNIFICANT CHANGE UP (ref 0.2–1.2)
BLD GP AB SCN SERPL QL: NEGATIVE — SIGNIFICANT CHANGE UP
BLD GP AB SCN SERPL QL: NEGATIVE — SIGNIFICANT CHANGE UP
BUN SERPL-MCNC: 5 MG/DL — LOW (ref 7–23)
BUN SERPL-MCNC: 5 MG/DL — LOW (ref 7–23)
CALCIUM SERPL-MCNC: 9.1 MG/DL — SIGNIFICANT CHANGE UP (ref 8.4–10.5)
CALCIUM SERPL-MCNC: 9.1 MG/DL — SIGNIFICANT CHANGE UP (ref 8.4–10.5)
CHLORIDE SERPL-SCNC: 104 MMOL/L — SIGNIFICANT CHANGE UP (ref 96–108)
CHLORIDE SERPL-SCNC: 104 MMOL/L — SIGNIFICANT CHANGE UP (ref 96–108)
CO2 SERPL-SCNC: 19 MMOL/L — LOW (ref 22–31)
CO2 SERPL-SCNC: 19 MMOL/L — LOW (ref 22–31)
COLLECT DURATION TIME UR: 24 HR — SIGNIFICANT CHANGE UP
COLLECT DURATION TIME UR: 24 HR — SIGNIFICANT CHANGE UP
CREAT ?TM UR-MCNC: 37 MG/DL — SIGNIFICANT CHANGE UP
CREAT ?TM UR-MCNC: 37 MG/DL — SIGNIFICANT CHANGE UP
CREAT SERPL-MCNC: 0.55 MG/DL — SIGNIFICANT CHANGE UP (ref 0.5–1.3)
CREAT SERPL-MCNC: 0.55 MG/DL — SIGNIFICANT CHANGE UP (ref 0.5–1.3)
EGFR: 120 ML/MIN/1.73M2 — SIGNIFICANT CHANGE UP
EGFR: 120 ML/MIN/1.73M2 — SIGNIFICANT CHANGE UP
EOSINOPHIL # BLD AUTO: 0.03 K/UL — SIGNIFICANT CHANGE UP (ref 0–0.5)
EOSINOPHIL # BLD AUTO: 0.03 K/UL — SIGNIFICANT CHANGE UP (ref 0–0.5)
EOSINOPHIL NFR BLD AUTO: 0.3 % — SIGNIFICANT CHANGE UP (ref 0–6)
EOSINOPHIL NFR BLD AUTO: 0.3 % — SIGNIFICANT CHANGE UP (ref 0–6)
FIBRINOGEN PPP-MCNC: 427 MG/DL — SIGNIFICANT CHANGE UP (ref 200–445)
FIBRINOGEN PPP-MCNC: 427 MG/DL — SIGNIFICANT CHANGE UP (ref 200–445)
GLUCOSE SERPL-MCNC: 94 MG/DL — SIGNIFICANT CHANGE UP (ref 70–99)
GLUCOSE SERPL-MCNC: 94 MG/DL — SIGNIFICANT CHANGE UP (ref 70–99)
GP B STREP DNA SPEC QL NAA+PROBE: DETECTED
HCT VFR BLD CALC: 35.6 % — SIGNIFICANT CHANGE UP (ref 34.5–45)
HCT VFR BLD CALC: 35.6 % — SIGNIFICANT CHANGE UP (ref 34.5–45)
HCT VFR BLD CALC: 38.9 %
HCV AB SER QL: NONREACTIVE
HCV S/CO RATIO: 0.09 S/CO
HGB BLD-MCNC: 12.3 G/DL — SIGNIFICANT CHANGE UP (ref 11.5–15.5)
HGB BLD-MCNC: 12.3 G/DL — SIGNIFICANT CHANGE UP (ref 11.5–15.5)
HGB BLD-MCNC: 12.6 G/DL
HIV1+2 AB SPEC QL IA.RAPID: NONREACTIVE
IMM GRANULOCYTES NFR BLD AUTO: 1.4 % — HIGH (ref 0–0.9)
IMM GRANULOCYTES NFR BLD AUTO: 1.4 % — HIGH (ref 0–0.9)
LDH SERPL L TO P-CCNC: 222 U/L — SIGNIFICANT CHANGE UP (ref 50–242)
LDH SERPL L TO P-CCNC: 222 U/L — SIGNIFICANT CHANGE UP (ref 50–242)
LYMPHOCYTES # BLD AUTO: 1.36 K/UL — SIGNIFICANT CHANGE UP (ref 1–3.3)
LYMPHOCYTES # BLD AUTO: 1.36 K/UL — SIGNIFICANT CHANGE UP (ref 1–3.3)
LYMPHOCYTES # BLD AUTO: 12.4 % — LOW (ref 13–44)
LYMPHOCYTES # BLD AUTO: 12.4 % — LOW (ref 13–44)
MCHC RBC-ENTMCNC: 32.4 GM/DL
MCHC RBC-ENTMCNC: 33.3 PG
MCHC RBC-ENTMCNC: 33.6 PG — SIGNIFICANT CHANGE UP (ref 27–34)
MCHC RBC-ENTMCNC: 33.6 PG — SIGNIFICANT CHANGE UP (ref 27–34)
MCHC RBC-ENTMCNC: 34.6 GM/DL — SIGNIFICANT CHANGE UP (ref 32–36)
MCHC RBC-ENTMCNC: 34.6 GM/DL — SIGNIFICANT CHANGE UP (ref 32–36)
MCV RBC AUTO: 102.9 FL
MCV RBC AUTO: 97.3 FL — SIGNIFICANT CHANGE UP (ref 80–100)
MCV RBC AUTO: 97.3 FL — SIGNIFICANT CHANGE UP (ref 80–100)
MONOCYTES # BLD AUTO: 0.83 K/UL — SIGNIFICANT CHANGE UP (ref 0–0.9)
MONOCYTES # BLD AUTO: 0.83 K/UL — SIGNIFICANT CHANGE UP (ref 0–0.9)
MONOCYTES NFR BLD AUTO: 7.5 % — SIGNIFICANT CHANGE UP (ref 2–14)
MONOCYTES NFR BLD AUTO: 7.5 % — SIGNIFICANT CHANGE UP (ref 2–14)
NEUTROPHILS # BLD AUTO: 8.6 K/UL — HIGH (ref 1.8–7.4)
NEUTROPHILS # BLD AUTO: 8.6 K/UL — HIGH (ref 1.8–7.4)
NEUTROPHILS NFR BLD AUTO: 78.1 % — HIGH (ref 43–77)
NEUTROPHILS NFR BLD AUTO: 78.1 % — HIGH (ref 43–77)
NRBC # BLD: 0 /100 WBCS — SIGNIFICANT CHANGE UP (ref 0–0)
NRBC # BLD: 0 /100 WBCS — SIGNIFICANT CHANGE UP (ref 0–0)
NT-PROBNP SERPL-SCNC: 55 PG/ML — SIGNIFICANT CHANGE UP (ref 0–300)
NT-PROBNP SERPL-SCNC: 55 PG/ML — SIGNIFICANT CHANGE UP (ref 0–300)
PLATELET # BLD AUTO: 198 K/UL — SIGNIFICANT CHANGE UP (ref 150–400)
PLATELET # BLD AUTO: 198 K/UL — SIGNIFICANT CHANGE UP (ref 150–400)
PLATELET # BLD AUTO: 205 K/UL
POTASSIUM SERPL-MCNC: 3.8 MMOL/L — SIGNIFICANT CHANGE UP (ref 3.5–5.3)
POTASSIUM SERPL-MCNC: 3.8 MMOL/L — SIGNIFICANT CHANGE UP (ref 3.5–5.3)
POTASSIUM SERPL-SCNC: 3.8 MMOL/L — SIGNIFICANT CHANGE UP (ref 3.5–5.3)
POTASSIUM SERPL-SCNC: 3.8 MMOL/L — SIGNIFICANT CHANGE UP (ref 3.5–5.3)
PROT 24H UR-MRATE: 184 MG/24 H — HIGH (ref 50–100)
PROT 24H UR-MRATE: 184 MG/24 H — HIGH (ref 50–100)
PROT ?TM UR-MCNC: 7 MG/DL — SIGNIFICANT CHANGE UP (ref 0–12)
PROT ?TM UR-MCNC: 7 MG/DL — SIGNIFICANT CHANGE UP (ref 0–12)
PROT SERPL-MCNC: 6.5 G/DL — SIGNIFICANT CHANGE UP (ref 6–8.3)
PROT SERPL-MCNC: 6.5 G/DL — SIGNIFICANT CHANGE UP (ref 6–8.3)
PROT/CREAT UR-RTO: 0.2 RATIO — SIGNIFICANT CHANGE UP (ref 0–0.2)
PROT/CREAT UR-RTO: 0.2 RATIO — SIGNIFICANT CHANGE UP (ref 0–0.2)
RBC # BLD: 3.66 M/UL — LOW (ref 3.8–5.2)
RBC # BLD: 3.66 M/UL — LOW (ref 3.8–5.2)
RBC # BLD: 3.78 M/UL
RBC # FLD: 13.5 % — SIGNIFICANT CHANGE UP (ref 10.3–14.5)
RBC # FLD: 13.5 % — SIGNIFICANT CHANGE UP (ref 10.3–14.5)
RBC # FLD: 14.4 %
RH IG SCN BLD-IMP: POSITIVE — SIGNIFICANT CHANGE UP
RH IG SCN BLD-IMP: POSITIVE — SIGNIFICANT CHANGE UP
SODIUM SERPL-SCNC: 133 MMOL/L — LOW (ref 135–145)
SODIUM SERPL-SCNC: 133 MMOL/L — LOW (ref 135–145)
SOURCE GBS: NORMAL
TOTAL VOLUME - 24 HOUR: 3075 ML — SIGNIFICANT CHANGE UP
TOTAL VOLUME - 24 HOUR: 3075 ML — SIGNIFICANT CHANGE UP
URATE SERPL-MCNC: 4.4 MG/DL — SIGNIFICANT CHANGE UP (ref 2.5–7)
URATE SERPL-MCNC: 4.4 MG/DL — SIGNIFICANT CHANGE UP (ref 2.5–7)
URINE CREATININE CALCULATION: 1 G/24 H — SIGNIFICANT CHANGE UP (ref 0.8–1.8)
URINE CREATININE CALCULATION: 1 G/24 H — SIGNIFICANT CHANGE UP (ref 0.8–1.8)
WBC # BLD: 11 K/UL — HIGH (ref 3.8–10.5)
WBC # BLD: 11 K/UL — HIGH (ref 3.8–10.5)
WBC # FLD AUTO: 10.64 K/UL
WBC # FLD AUTO: 11 K/UL — HIGH (ref 3.8–10.5)
WBC # FLD AUTO: 11 K/UL — HIGH (ref 3.8–10.5)

## 2023-12-18 PROCEDURE — 76818 FETAL BIOPHYS PROFILE W/NST: CPT

## 2023-12-18 PROCEDURE — 76816 OB US FOLLOW-UP PER FETUS: CPT

## 2023-12-18 PROCEDURE — 76820 UMBILICAL ARTERY ECHO: CPT | Mod: 59

## 2023-12-18 PROCEDURE — 93010 ELECTROCARDIOGRAM REPORT: CPT

## 2023-12-18 PROCEDURE — 99221 1ST HOSP IP/OBS SF/LOW 40: CPT

## 2023-12-18 RX ORDER — CITRIC ACID/SODIUM CITRATE 300-500 MG
15 SOLUTION, ORAL ORAL EVERY 6 HOURS
Refills: 0 | Status: DISCONTINUED | OUTPATIENT
Start: 2023-12-18 | End: 2023-12-19

## 2023-12-18 RX ORDER — FENTANYL/BUPIVACAINE/NS/PF 2MCG/ML-.1
250 PLASTIC BAG, INJECTION (ML) INJECTION
Refills: 0 | Status: DISCONTINUED | OUTPATIENT
Start: 2023-12-18 | End: 2023-12-22

## 2023-12-18 RX ORDER — AMPICILLIN TRIHYDRATE 250 MG
1 CAPSULE ORAL EVERY 4 HOURS
Refills: 0 | Status: DISCONTINUED | OUTPATIENT
Start: 2023-12-18 | End: 2023-12-19

## 2023-12-18 RX ORDER — CHLORHEXIDINE GLUCONATE 213 G/1000ML
1 SOLUTION TOPICAL DAILY
Refills: 0 | Status: DISCONTINUED | OUTPATIENT
Start: 2023-12-18 | End: 2023-12-19

## 2023-12-18 RX ORDER — AMPICILLIN TRIHYDRATE 250 MG
2 CAPSULE ORAL ONCE
Refills: 0 | Status: COMPLETED | OUTPATIENT
Start: 2023-12-18 | End: 2023-12-18

## 2023-12-18 RX ORDER — OXYTOCIN 10 UNIT/ML
2 VIAL (ML) INJECTION
Qty: 30 | Refills: 0 | Status: DISCONTINUED | OUTPATIENT
Start: 2023-12-18 | End: 2023-12-19

## 2023-12-18 RX ORDER — OXYTOCIN 10 UNIT/ML
333.33 VIAL (ML) INJECTION
Qty: 20 | Refills: 0 | Status: DISCONTINUED | OUTPATIENT
Start: 2023-12-18 | End: 2023-12-19

## 2023-12-18 RX ORDER — SODIUM CHLORIDE 9 MG/ML
1000 INJECTION, SOLUTION INTRAVENOUS
Refills: 0 | Status: DISCONTINUED | OUTPATIENT
Start: 2023-12-18 | End: 2023-12-19

## 2023-12-18 RX ADMIN — Medication 108 GRAM(S): at 21:58

## 2023-12-18 RX ADMIN — SODIUM CHLORIDE 125 MILLILITER(S): 9 INJECTION, SOLUTION INTRAVENOUS at 20:30

## 2023-12-18 RX ADMIN — SODIUM CHLORIDE 125 MILLILITER(S): 9 INJECTION, SOLUTION INTRAVENOUS at 12:58

## 2023-12-18 RX ADMIN — Medication 2 MILLIUNIT(S)/MIN: at 15:20

## 2023-12-18 RX ADMIN — Medication 108 GRAM(S): at 18:16

## 2023-12-18 RX ADMIN — Medication 216 GRAM(S): at 14:15

## 2023-12-18 NOTE — PRE-ANESTHESIA EVALUATION ADULT - NSANTHOSAYNRD_GEN_A_CORE
No. FATOUMATA screening performed.  STOP BANG Legend: 0-2 = LOW Risk; 3-4 = INTERMEDIATE Risk; 5-8 = HIGH Risk

## 2023-12-18 NOTE — OB PROVIDER LABOR PROGRESS NOTE - ASSESSMENT
- Cat I FHT  - Espinoza balloon in place  - Plan to start pitocin in 30 min  - Dr. Castellanos updated - Cat I FHT  - Reece balloon in place  - Plan to start pitocin in 30 min  - Cardiology to see pt for persistent sinus tachycardia and previous recs of starting metoprolol   - Dr. Castellanos updated - Cat I FHT  - Reece balloon in place  - Plan to start pitocin in 30 min  - Cardiology consulted for persistent sinus tachycardia and previous consult during antepartum admission. D/w cards attending and fellow, rec tele adn starting toprolol 25 QD after delivery  - Dr. Castellanos updated

## 2023-12-18 NOTE — OB RN PATIENT PROFILE - NSPRENATALGBS_OBGYN_ALL_OB_START_DATE
March 14, 2019       Hesham Ann MD  [address]  [facility] [address]  VIA In Basket      Patient: Inga Xiao   YOB: 1945   Date of Visit: 3/14/2019       Dear Dr. Ann:    Thank you for referring Inga Xiao to me for evaluation. Below are my notes for this visit with her.    If you have questions, please do not hesitate to call me. I look forward to following your patient along with you.      Sincerely,        Ti Melgar MD        CC: No Recipients  Ti Melgar MD  3/14/2019  1:38 PM  Sign at close encounter  Procedures     Endo-Anal Ultrasound Procedure Note    Date of procedure: March 14, 2019    Procedure performed:  Endo-anal Ultrasound (16mHz), 3-D Ultrasound Interpretation    Indication: Fecal Incontinence    Findings:                    1.  Normal Upper anal canal, Normal puborectalis muscle    2.  Normal external anal sphincter in mid anal canal      3.  Normal external anal sphincter in distal anal canal    4.  Normal internal anal sphincter     5.  No evidence of sphincter defect     Attending physician: Ti Melgar M.D. FACS FASCRS    Assistant: SHAN Yeung RN    Anesthesia: None    Procedure: After obtaining informed consent and explaining the risks and benefits of procedure, which include but are not limited to pain, bleeding, infection, damage to surrounding tissues, and intestinal perforation, the patient was taken to the procedure room at [facility].    After a surgical timeout was conducted. The patient was positioned in left lateral position. All points of pressure were padded appropriately.  At this point a visual inspection of the anus was performed.  The anal aperture was closed. The patient had a normal cutaneous anal reflex.  A digital rectal exam was performed which revealed diminished resting and squeeze pressures of the sphincter.     A well-lubricated  16mHz, 3-D ultrasonic probe was introduced into the anal canal under direct vision and was advanced with ease to the level of upper anal canal. This location was confirmed by the presence of the puborectalis muscular sling. The ultrasound probe was slowly withdrawn and a detailed ultrasonic examination of the anal canal was conducted.  The upper anal canal was normal. In the mid anal canal the external anal sphincter was normal.  The internal anal sphincter in the mid anal canal was normal without any evidence of sphincter defect. The distal anal canal was normal.  There was no evidence of sphincter defect. At this point, 3-D imaging was performed to better delineate the previously mentioned findings.    The probe was completely removed from the patient. At the end of the procedure, the patient was taken to the recovery area.  She tolerated the procedure well and was subsequently discharged home.    Recommendations:   1. I recommend beginning a diet that is rich in whole grain oats, raw leafy greens, fruits and vegetables (30 g of fiber daily)  2. The patient was instructed to limit excessive fluid intake  3. I recommend starting Benefiber 1 tablespoon mixed with 4 ounces of water twice a day   4. I recommend that the patient undergo an endo-anal ultrasound to rule out an occult sphincter defect  5. If the symptoms fail to improve, I would consider adding slowing agents (Imodium and Lomotil)   6. If the patient´s symptoms are refractory to lifestyle modifications, I would consider pelvic floor physical therapy and biofeedback  7. If the patient´s fail to respond to conservative measures, I would consider sacral nerve modulation     Ti Melgar MD [facility]  [facility]        Ti Melgar MD  3/14/2019  1:37 PM  Sign at close encounter  Subjective   Patient ID: Inga is a 73 year old female.    No chief complaint on file.    HPI  The patient presents the office for routine follow-up.   She states that she is doing well.  She notes that her symptoms of fecal incontinence have improved.  She intermittently takes over-the-counter antidiarrheal medication.  She states that her symptoms are urinary incontinence have also improved.  She underwent a anorectal manometry this morning and presents the office for the endoanal ultrasound.    Patient's medications, allergies, past medical, surgical, social and family histories were reviewed and updated as appropriate.    Review of Systems   Constitutional: Negative.    HENT: Negative.    Eyes: Negative.    Respiratory: Negative.    Cardiovascular: Negative.    Gastrointestinal: Negative.    Endocrine: Negative.    Genitourinary: Negative.    Musculoskeletal: Negative.    Skin: Negative.    Allergic/Immunologic: Negative.    Neurological: Negative.    Hematological: Negative.    Psychiatric/Behavioral: Negative.        Objective   Physical Exam   Constitutional: She appears well-developed and well-nourished.   HENT:   Head: Normocephalic and atraumatic.   Abdominal: Soft. Bowel sounds are normal. She exhibits no distension and no mass. There is no tenderness. There is no rebound and no guarding. No hernia.   Genitourinary:   Genitourinary Comments: Rectal exam:  Normal anal margin, weak tone at rest and squeeze, no evidence of masses appreciated digital rectal exam   Nursing note and vitals reviewed.      Results:  High Resolution Anorectal Manometry Procedure Note     Date of procedure: March 14, 2019     Procedure performed: 3-D High Resolution Anorectal Manometry     Indication: Fecal Incontinence     Findings:     1.  Normal length of High pressure zone of anal sphincter complex   2.  Low resting Sphincter pressure   3.  Low squeeze pressure   4.  Abnormal rectal-Anal pressure differential   5. Recto-Anal Inhibitory Reflex (RAIR) present    6.  Diminished rectal sensation     Attending physician: Ti Melgar MD FACS FASCRS     Assistant: None      Anesthesia: None     Procedure: After obtaining informed consent and explaining the risks and benefits of procedure, which include but are not limited to pain, bleeding, infection, damage to surrounding tissues, and intestinal perforation, the patient was taken to the GI suite at [facility]. The patient verbalized that they understood the risks and benefits of the procedure and wished to proceed.     After a surgical timeout was conducted. The patient was positioned in left lateral position and visual inspection of the anus was performed.  The anal margin was normal. A digital rectal exam was performed which revealed diminished resting pressures and squeeze pressures of the sphincter. There was no stool in the rectal vault.     A well-lubricated 3-D High resolution anorectal manometery probe was introduced into the anal canal under direct vision and was advanced with ease to the level of upper anal canal. This location was confirmed by the presence of the high pressure zone in of the sphincter complex.  The high pressure zone was of normal length (3.1 cm). At this point, the resting pressure of the anal sphincter complex was measured to be 36.9 mmHg (low). The squeeze pressure was measured to be 58.3 mmHg (low).     Attention was turned to determining the pressure gradient between the rectum and anus during a valsalva manuever.  On all attempts, the patient demonstrated an abnormal rectal-Anal pressure gradient due to paradoxical movement of the puborectalis muscle.     The Recto-Anal Inhibitory Reflex was present at 20cm. The patient demonstrated a normal anal-cough reflex.     At this point, attention was turned to determining the rectal sensations.  The patient demonstrated abnormal sensation at first sensation (70cc).  However, the patient demonstrated abnormal sensation at first urge (150cc) and discomfort (180cc).     The probe was completely removed from the patient. At the end of the  procedure, the patient was taken to the recovery area.  The patient tolerated the procedure well and was subsequently discharged home.     Assessment:  Fecal Incontinence     Recommendations:  1. I recommend beginning a diet that is rich in whole grain oats, raw leafy greens, fruits and vegetables (30 g of fiber daily)  2. The patient was instructed to limit excessive fluid intake  3. I recommend starting Benefiber 1 tablespoon mixed with [4] ounces of water twice a day   4. I recommend that the patient undergo an endo-anal ultrasound to rule out an occult sphincter defect  5. If the symptoms fail to improve, I would consider adding slowing agents (Imodium and Lomotil)   6. If the patient´s symptoms are refractory to lifestyle modifications, I would consider pelvic floor physical therapy and biofeedback  7. If the patient´s fail to respond to conservative measures, I would consider sacral nerve modulation      Ti Melgar MD [facility]  Colon and Rectal Surgery      Assessment   Problem List Items Addressed This Visit        Digestive    Fecal incontinence - Primary     1.  The patient was informed of the anal rectal manometry results  2.  The patient was encouraged to continue her high-fiber diet  3.  Fiber supplementation twice a day  4.  The patient was instructed to avoid excessive water intake  5.  The patient was encouraged to avoid bladder irritants in her diet  6.  If her symptoms return or fail to improve with conservative measures, consider pelvic floor physical therapy  7.  Return to clinic in 1 year    Ti Melgar MD [facility]  Colon rectal surgery                          15-Dec-2023

## 2023-12-18 NOTE — CONSULT NOTE ADULT - ATTENDING COMMENTS
Patient is a 37 yo F  with Pmhx of Inappropriate Sinus Tachycardia, previously evaluated on prior admission, admitted for Delivery. Cardiology is consulted for Sinus Tachycardia    Review of Studies:  - ECG 2023: Sinus Tachycardia  - ECG 11/15/23: ST at 117bpm with No specific ST chnages  - ECG 23: ST at 124 bpm  - TTE 2023: The left ventricle is normal in size, wall thickness, and systolic function with a calculated ejection fraction of 55-60%. There are no regional wall motion abnormalities seen. There is normal left ventricular diastolic function and filling pressure.      #Inappropriate Sinus Tachycardia  - Patient is known to the Cardiology service from her prior Hospitalization  - She has a history of palpitations for which she underwent 1 day event monitor over 10 years ago with baseline HR in the 100's by apple Watch, frequently reaching 130's-140's when she is animated or anxious.   - She was evaluated during her previous Hospitalization on November. HR overnight on admission ranged from 100's-130's even while asleep, suggesting Inappropriate persistent sinus tach  - Patient had scheduled appointment with EP on day of DC for 2 weeks Zio Patch. The plan was to start Toprol 25 mg po daily after first week of Ambulatory ECG monitoring to allow for contrast and evaluation of one week monitoring pre and post treatment. However, patient reports she was NOT sent home on .  - The ZioPatch has been mailed back, but results are not yet available for review  - ECHO performed last hospitalization revealed normal Biventricular function without valvular heart disease  - Clinically patient is warm, well perfused and compensated without lower extremity edema or hypoxia. She is in active labor.  - At this time recommend Starting Toprol 25 mg po daily AFTER patient has delivered. Also recommend placing patient on Tele for monitoring post delivery  - Pt has outpatient appointment established with Dr Samantha Dimas on Dec 21st in [address] [place] [address] at 3 Pm  - Cardiology will continue to follow this Hospitalization, please call with any questions Patient is a 39 yo F  with Pmhx of Inappropriate Sinus Tachycardia, previously evaluated on prior admission, admitted for Delivery. Cardiology is consulted for Sinus Tachycardia    Review of Studies:  - ECG 2023: Sinus Tachycardia  - ECG 11/15/23: ST at 117bpm with No specific ST chnages  - ECG 23: ST at 124 bpm  - TTE 2023: The left ventricle is normal in size, wall thickness, and systolic function with a calculated ejection fraction of 55-60%. There are no regional wall motion abnormalities seen. There is normal left ventricular diastolic function and filling pressure.      #Inappropriate Sinus Tachycardia  - Patient is known to the Cardiology service from her prior Hospitalization  - She has a history of palpitations for which she underwent 1 day event monitor over 10 years ago with baseline HR in the 100's by apple Watch, frequently reaching 130's-140's when she is animated or anxious.   - She was evaluated during her previous Hospitalization on November. HR overnight on admission ranged from 100's-130's even while asleep, suggesting Inappropriate persistent sinus tach  - Patient had scheduled appointment with EP on day of DC for 2 weeks Zio Patch. The plan was to start Toprol 25 mg po daily after first week of Ambulatory ECG monitoring to allow for contrast and evaluation of one week monitoring pre and post treatment. However, patient reports she was NOT sent home on .  - The ZioPatch has been mailed back, but results are not yet available for review  - ECHO performed last hospitalization revealed normal Biventricular function without valvular heart disease  - Clinically patient is warm, well perfused and compensated without lower extremity edema or hypoxia. She is in active labor.  - At this time recommend Starting Toprol 25 mg po daily AFTER patient has delivered. Also recommend placing patient on Tele for monitoring post delivery  - Pt has outpatient appointment established with Dr Samantha Dimas on Dec 21st in [address] [place] [address] at 3 Pm  - Cardiology will continue to follow this Hospitalization, please call with any questions

## 2023-12-18 NOTE — OB PROVIDER TRIAGE NOTE - HISTORY OF PRESENT ILLNESS
HPI: 38y yo Female  at 37w1d sent from  for NRFHT.   She was recently seen in triage for elevated BP in clinic and has been monitoring at home, was instructed to schedule IOL. ***She denies headache, changes in vision, RUQ pain. Pregnancy was complicated by an antepartum admission for palpitations, where she subsequently underwent cardiology follow up and will undergo holter monitoring soon per patient.  - Fetal movement: endorses  - Contractions: denies  - Leakage of fluid: denies  - Vaginal bleeding: denies    Antepartum:  - Pregnancy type: spontaneous  - Testing: NIPT wnl  - Anatomy scan: wnl  - GCT/GTT: passed  - Hypertensive disorders of pregnancy: gHTN   - GBS status: unknown  - EFW: 2500g    - ObHx: G1 MAB DC 2020. G2 current   - GynHx: Fibroid x1  - PMH: denies  - PSH: denies  - SH: denies toxic habits  - Meds: Prenatal vitamins, PO iron, ASA stopped 36wk  - Allergies: No Known Allergies   HPI: 38y yo Female  at 37w1d with pregnancy complicated by gestational HTN and FGR (AC 7%tile, EFW 18%ile) and sinus tachycardia was sent from  for NRFHT (2 variable decels). She was recently seen in triage for elevated BP in clinic and has been monitoring at home (with DBP 90s) and was instructed to schedule IOL but hasn't yet. She currently denies headache, changes in vision, RUQ pain. She denies ctx, VB, LOF. Endorses fetal movements.    Antepartum: spontaneous conception. NIPT LR. Anatomy scan wnl. FGR diagnosed at 29wks, AC 7%tile, EFW 18%tile, dopplers normal. Passed GCT. Elevated BP around 33 weeks, 24hr urine protein today 275. Patient has a history of sinus tachycardia, underwent prolonged monitoring in  per patient was normal. Pt admitted to ante service 12/15 for tachycardia, seen by cardiology with normal TTE, LE Dopplers, chest x-ray and EKG showing sinus tach. She completed two weeks of Darrin monitoring but is unaware of results, had f/u appts 2023. Per cardiology note 12/15 pt was to start Toprol 25mg after 1 week of monitoring but patient has not started any medication. GBS positive. EFW: 2730g    - ObHx: G1 MAB DC . G2 current   - GynHx: anterior subserosal myomas x3 (5y6c3re, 1e7i0cf, 1x1x1.5cm), denies STI, abnormal pap, ovarian cysts  - PMH: sinus tachycardia as above   - PSH: denies  - Meds: Prenatal vitamins, PO iron, ASA stopped at 36wk  - Allergies: No Known Allergies    PE:  T(C): 36.5 (23 @ 10:11), Max: 36.5 (23 @ 10:11)  HR: 117 (23 @ 10:11) (117 - 117)  BP: 118/79 (23 @ 10:11) (118/79 - 118/79)  RR: 20 (23 @ 10:11) (20 - 20)  SpO2: 100% (23 @ 10:11) (100% - 100%)  GEN: well-appearing, NAD  PULM: no increased WOB  ABD: soft, nontender, gravid  EXT: mild LE edema  TAUS: vertex, BPP 8/8, DOROTA 13.5cm, placenta anterior  SVE: 1/L    FHT: baseline 145, moderate variability, +accels, no decels  TOCO: no ctx  reactive & reassuring     A&P: 38y yo  at 37w1d with pregnancy complicated by gestational HTN and FGR (AC 7%tile, EFW 18%ile) and sinus tachycardia sent from [facility] for variable decels today. Fetal status reassuring.   - Admit to L&D for IOL given gestational HTN and NRFHT, although fetal testing in triage is wnl and reassuring  - EKG ordered  - Consents signed  - PN reviewed, GBS+ ordered for ppx  - NPO, IVF  - cardiology consult for sinus tachycardia and if they recommend starting Toprol  - continuous tocometry, FHT   - IOL with plummer/pitocin    D/W Dr. Flowers PGY3  D/W Dr. Newby   HPI: 38y yo Female  at 37w1d with pregnancy complicated by gestational HTN and FGR (AC 7%tile, EFW 18%ile) and sinus tachycardia was sent from  for NRFHT (2 variable decels). She was recently seen in triage for elevated BP in clinic and has been monitoring at home (with DBP 90s) and was instructed to schedule IOL but hasn't yet. She currently denies headache, changes in vision, RUQ pain. She denies ctx, VB, LOF. Endorses fetal movements.    Antepartum: spontaneous conception. NIPT LR. Anatomy scan wnl. FGR diagnosed at 29wks, AC 7%tile, EFW 18%tile, dopplers normal. Passed GCT. Elevated BP around 33 weeks, 24hr urine protein today 275. Patient has a history of sinus tachycardia, underwent prolonged monitoring in  per patient was normal. Pt admitted to ante service 12/15 for tachycardia, seen by cardiology with normal TTE, LE Dopplers, chest x-ray and EKG showing sinus tach. She completed two weeks of Darrin monitoring but is unaware of results, had f/u appts 2023. Per cardiology note 12/15 pt was to start Toprol 25mg after 1 week of monitoring but patient has not started any medication. GBS positive. EFW: 2730g    - ObHx: G1 MAB DC . G2 current   - GynHx: anterior subserosal myomas x3 (3p8t9ib, 3y1p2mw, 1x1x1.5cm), denies STI, abnormal pap, ovarian cysts  - PMH: sinus tachycardia as above   - PSH: denies  - Meds: Prenatal vitamins, PO iron, ASA stopped at 36wk  - Allergies: No Known Allergies    PE:  T(C): 36.5 (23 @ 10:11), Max: 36.5 (23 @ 10:11)  HR: 117 (23 @ 10:11) (117 - 117)  BP: 118/79 (23 @ 10:11) (118/79 - 118/79)  RR: 20 (23 @ 10:11) (20 - 20)  SpO2: 100% (23 @ 10:11) (100% - 100%)  GEN: well-appearing, NAD  PULM: no increased WOB  ABD: soft, nontender, gravid  EXT: mild LE edema  TAUS: vertex, BPP 8/8, DOROTA 13.5cm, placenta anterior  SVE: 1/L    FHT: baseline 145, moderate variability, +accels, no decels  TOCO: no ctx  reactive & reassuring     A&P: 38y yo  at 37w1d with pregnancy complicated by gestational HTN and FGR (AC 7%tile, EFW 18%ile) and sinus tachycardia sent from [facility] for variable decels today. Fetal status reassuring.   - Admit to L&D for IOL given gestational HTN and NRFHT, although fetal testing in triage is wnl and reassuring  - EKG ordered  - Consents signed  - PN reviewed, GBS+ ordered for ppx  - NPO, IVF  - cardiology consult for sinus tachycardia and if they recommend starting Toprol  - continuous tocometry, FHT   - IOL with plummer/pitocin    D/W Dr. Flowers PGY3  D/W Dr. Newby

## 2023-12-18 NOTE — OB RN PATIENT PROFILE - NSSDOHTRANSPORT_OBGYN_A_OB
Last Appointment:  12/14/2022  Future Appointments   Date Time Provider Linh Colon   2/16/2023  1:15 PM SCHEDULE, SE [facility]   4/12/2023  1:00 PM Monae Whalen,  [facility] no

## 2023-12-18 NOTE — OB PROVIDER H&P - PRO ANTIBODY SCREEN
pt baseline, moving extremities, no deficits, no signs of trauma, aware of signs and symptoms to return for, will follow up with pediatrician.
negative

## 2023-12-18 NOTE — OB PROVIDER H&P - HISTORY OF PRESENT ILLNESS
HPI: 38y yo Female  at 37w1d with pregnancy complicated by gestational HTN and FGR (AC 7%tile, EFW 18%ile) and sinus tachycardia was sent from  for NRFHT (2 variable decels). She was recently seen in triage for elevated BP in clinic and has been monitoring at home (with DBP 90s) and was instructed to schedule IOL but hasn't yet. She currently denies headache, changes in vision, RUQ pain. She denies ctx, VB, LOF. Endorses fetal movements.    Antepartum: spontaneous conception. NIPT LR. Anatomy scan wnl. FGR diagnosed at 29wks, AC 7%tile, EFW 18%tile, dopplers normal. Passed GCT. Elevated BP around 33 weeks, 24hr urine protein today 275. Patient has a history of sinus tachycardia, underwent prolonged monitoring in  per patient was normal. Pt admitted to ante service 12/15 for tachycardia, seen by cardiology with normal TTE, LE Dopplers, chest x-ray and EKG showing sinus tach. She completed two weeks of Darrin monitoring but is unaware of results, had f/u appts 2023. Per cardiology note 12/15 pt was to start Toprol 25mg after 1 week of monitoring but patient has not started any medication. GBS positive. EFW: 2730g    - ObHx: G1 MAB DC . G2 current   - GynHx: anterior subserosal myomas x3 ([id number], 0n4e0uk, 1x1x1.5cm), denies STI, abnormal pap, ovarian cysts  - PMH: sinus tachycardia as above   - PSH: denies  - Meds: Prenatal vitamins, PO iron, ASA stopped at 36wk  - Allergies: No Known Allergies    PE:  T(C): 36.5 (23 @ 10:11), Max: 36.5 (23 @ 10:11)  HR: 117 (23 @ 10:11) (117 - 117)  BP: 118/79 (23 @ 10:11) (118/79 - 118/79)  RR: 20 (23 @ 10:11) (20 - 20)  SpO2: 100% (23 @ 10:11) (100% - 100%)  GEN: well-appearing, NAD  PULM: no increased WOB  ABD: soft, nontender, gravid  EXT: mild LE edema  TAUS: vertex, BPP 8/8, DOROTA 13.5cm, placenta anterior  SVE: 1/L    FHT: baseline 145, moderate variability, +accels, no decels  TOCO: no ctx  reactive & reassuring     A&P: 38y yo  at 37w1d with pregnancy complicated by gestational HTN and FGR (AC 7%tile, EFW 18%ile) and sinus tachycardia sent from [facility] for variable decels today. Fetal status reassuring.   - Admit to L&D for IOL given gestational HTN and NRFHT, although fetal testing in triage is wnl and reassuring  - EKG ordered  - Consents signed  - PN reviewed, GBS+ ordered for ppx  - NPO, IVF  - cardiology consult for sinus tachycardia and if they recommend starting Toprol  - continuous tocometry, FHT   - IOL with plummer/pitocin    D/W Dr. Flowers PGY3  D/W Dr. Newby   HPI: 38y yo Female  at 37w1d with pregnancy complicated by gestational HTN and FGR (AC 7%tile, EFW 18%ile) and sinus tachycardia was sent from  for NRFHT (2 variable decels). She was recently seen in triage for elevated BP in clinic and has been monitoring at home (with DBP 90s) and was instructed to schedule IOL but hasn't yet. She currently denies headache, changes in vision, RUQ pain. She denies ctx, VB, LOF. Endorses fetal movements.    Antepartum: spontaneous conception. NIPT LR. Anatomy scan wnl. FGR diagnosed at 29wks, AC 7%tile, EFW 18%tile, dopplers normal. Passed GCT. Elevated BP around 33 weeks, 24hr urine protein today 275. Patient has a history of sinus tachycardia, underwent prolonged monitoring in  per patient was normal. Pt admitted to ante service 12/15 for tachycardia, seen by cardiology with normal TTE, LE Dopplers, chest x-ray and EKG showing sinus tach. She completed two weeks of Darrin monitoring but is unaware of results, had f/u appts 2023. Per cardiology note 12/15 pt was to start Toprol 25mg after 1 week of monitoring but patient has not started any medication. GBS positive. EFW: 2730g    - ObHx: G1 MAB DC . G2 current   - GynHx: anterior subserosal myomas x3 (8p1l7wn, 3w6k0pi, 1x1x1.5cm), denies STI, abnormal pap, ovarian cysts  - PMH: sinus tachycardia as above   - PSH: denies  - Meds: Prenatal vitamins, PO iron, ASA stopped at 36wk  - Allergies: No Known Allergies    PE:  T(C): 36.5 (23 @ 10:11), Max: 36.5 (23 @ 10:11)  HR: 117 (23 @ 10:11) (117 - 117)  BP: 118/79 (23 @ 10:11) (118/79 - 118/79)  RR: 20 (23 @ 10:11) (20 - 20)  SpO2: 100% (23 @ 10:11) (100% - 100%)  GEN: well-appearing, NAD  PULM: no increased WOB  ABD: soft, nontender, gravid  EXT: mild LE edema  TAUS: vertex, BPP 8/8, DOROTA 13.5cm, placenta anterior  SVE: 1/L    FHT: baseline 145, moderate variability, +accels, no decels  TOCO: no ctx  reactive & reassuring     A&P: 38y yo  at 37w1d with pregnancy complicated by gestational HTN and FGR (AC 7%tile, EFW 18%ile) and sinus tachycardia sent from [facility] for variable decels today. Fetal status reassuring.   - Admit to L&D for IOL given gestational HTN and NRFHT, although fetal testing in triage is wnl and reassuring  - EKG ordered  - Consents signed  - PN reviewed, GBS+ ordered for ppx  - NPO, IVF  - cardiology consult for sinus tachycardia and if they recommend starting Toprol  - continuous tocometry, FHT   - IOL with plummer/pitocin    D/W Dr. Flowers PGY3  D/W Dr. Newby

## 2023-12-18 NOTE — CONSULT NOTE ADULT - SUBJECTIVE AND OBJECTIVE BOX
HPI:  HPI: 38y yo Female  at 37w1d with pregnancy complicated by gestational HTN and FGR (AC 7%tile, EFW 18%ile) and sinus tachycardia was sent from  for NRFHT (2 variable decels). She was recently seen in triage for elevated BP in clinic and has been monitoring at home (with DBP 90s) and was instructed to schedule IOL but hasn't yet. She currently denies headache, changes in vision, RUQ pain. She denies ctx, VB, LOF. Endorses fetal movements.    Antepartum: spontaneous conception. NIPT LR. Anatomy scan wnl. FGR diagnosed at 29wks, AC 7%tile, EFW 18%tile, dopplers normal. Passed GCT. Elevated BP around 33 weeks, 24hr urine protein today 275. Patient has a history of sinus tachycardia, underwent prolonged monitoring in  per patient was normal. Pt admitted to ante service 12/15 for tachycardia, seen by cardiology with normal TTE, LE Dopplers, chest x-ray and EKG showing sinus tach. She completed two weeks of Darrin monitoring but is unaware of results, had f/u appts 2023. Per cardiology note 12/15 pt was to start Toprol 25mg after 1 week of monitoring but patient has not started any medication. GBS positive. EFW: 2730g     (18 Dec 2023 13:08)    HPI  38y yo Female  at 37w1d with pregnancy complicated by gestational HTN, longstanding history of sinus tachycardia, who presents for induction of labor. She was admitted last month for sinus tachycardia workup. Workup at that time was unremarkable. She had Zio patch monitoring outpatient but results have not yet been uploaded. She was recommended to start Toprol after monitoring, but the medication was not sent to the pharmacy. She is asymptomatic.       ROS: A 10-point review of systems was otherwise negative.    PAST MEDICAL & SURGICAL HISTORY:  No pertinent past medical history      No significant past surgical history          SOCIAL HISTORY:  FAMILY HISTORY:      ALLERGIES: 	  No Known Allergies            MEDICATIONS:  ampicillin  IVPB 1 Gram(s) IV Intermittent every 4 hours  chlorhexidine 2% Cloths 1 Application(s) Topical daily  citric acid/sodium citrate Solution 15 milliLiter(s) Oral every 6 hours  lactated ringers. 1000 milliLiter(s) IV Continuous <Continuous>  oxytocin Infusion 333.333 milliUNIT(s)/Min IV Continuous <Continuous>  oxytocin Infusion. 2 milliUNIT(s)/Min IV Continuous <Continuous>      HOME MEDICATIONS:  ferrous sulfate 325 mg (65 mg elemental iron) oral tablet: 1 tab(s) orally once a day  Prenatal Multivitamins with Folic Acid 1 mg oral tablet: 1 tab(s) orally once a day      PHYSICAL EXAM:  Height (cm): 167.6 ( @ 12:58)  Weight (kg): 65.771 ( @ 12:58)  BMI (kg/m2): 23.4 ( @ 12:58)  BSA (m2): 1.74 ( @ 12:58)    I/O Summary 24H      T(F): 97.9 (23 @ 12:58), Max: 97.9 (23 @ 12:58)  HR: 124 (23 @ 12:58) (117 - 124)  BP: 110/80 (23 @ 12:58) (110/80 - 118/79)  BP(mean): --  ABP: --  ABP(mean): --  RR: 18 (23 @ 12:58) (18 - 20)  SpO2: 100% (23 @ 12:58) (100% - 100%)    GEN: Awake, comfortable. NAD.   HEENT: NCAT, PERRL, EOMI. Mucosa moist. No JVD.   RESP: CTA b/l  CV: RRR, normal s1/s2. No m/r/g.  ABD: Soft, NTND. BS+  EXT: Warm. No edema, clubbing, or cyanosis.   NEURO: AAOx3. No focal deficits.      	  LABS:	 	    Cardiac Markers             CBC 23 @ 11:00                        12.3   11.00 )-----------( 198                   35.6       Hgb trend: 12.3 <-- , 11.9 <--   WBC trend: 11.00 <-- , 9.86 <--     CMP 23 @ 11:00    133<L>  |  104  |  5<L>  ----------------------------<  94  3.8   |  19<L>  |  0.55    Ca    9.1      23 @ 11:00    TPro  6.5  /  Alb  3.5  /  TBili  0.8  /  DBili  x   /  AST  22  /  ALT  12  /  AlkPhos  140<H>        Serum Cr trend: 0.55 <-- , 0.56 <--   proBNP:   Lipid Profile:   HgA1c:   TSH:     TELEMETRY: 	    ECG:  	  RADIOLOGY:   ECHO:  STRESS:  CATH:

## 2023-12-18 NOTE — OB RN PATIENT PROFILE - NS PRO DEPRESSION SCREENING Y/N1
History of Present Illness


General


Chief Complaint:  Multiple Trauma/Fall


Source:  Family Member





Present Illness


HPI


Is accompanied by his mother.  She states that he was sitting on her couch when 

he fell backwards and struck the back of his head on the edge of the coffee 

table.  She states that the couch in the coffee table her about level with each 

other.  She states she noted that there was a laceration on the back of his 

head.  She denies loss of consciousness.  She states that initially he cried 

but then since about a few minutes after the injury has been acting normally 

and does not appear to be in pain.  There is been no vomiting.  He has normal 

behavior and activity.  He has been eating and drinking.  She states that the 

couch and coffee table are very close to the floor, approximately a foot and he 

did fall to the floor after that.  There are no other complaints.


Allergies:  


Coded Allergies:  


     No Known Allergies (Unverified , 11/24/19)





Patient History


Past Medical History:  none


Pertinent Family History:  no significant inherited disorders


Immunizations:  UTD


Reviewed Nursing Documentation:  PMH: Agreed; PSxH: Agreed





Nursing Documentation-PM


Past Medical History:  No Stated History





Review of Systems


All Other Systems:  negative except mentioned in HPI





Physical Exam


Physical Exam





Vital Signs








  Date Time  Temp Pulse Resp B/P (MAP) Pulse Ox O2 Delivery O2 Flow Rate FiO2


 


11/24/19 10:47 97.9 108 35 106/59 (75) 97 Room Air  








Sp02 EP Interpretation:  reviewed, normal


General Appearance:  no apparent distress, alert, non-toxic, normal 

attentiveness for age, normal consolability


Head:  normocephalic, other - 1 cm superficial laceration over the occiput with 

some mild swelling.  No active bleeding.


Eyes:  bilateral eye normal inspection, bilateral eye PERRL


ENT:  normal ENT inspection, other - nasal congestion, rhinorrhea


Neck:  normal inspection, neck supple, symmetric, no masses, no bony tend, full 

ROM without pain


Respiratory:  effort normal, no rhonchi, no wheezing, no retractions, chest 

symmetric, speaking in full sentences


Cardiovascular:  normal inspection, RRR


Gastrointestinal:  normal inspection, non tender, non-distended, no rebound/

guarding


Musculoskeletal:  normal inspection, digits & nails normal, normal ROM, 

strength & tone normal, joints non-tender, moves extm spontaneously


Neurologic:  normal inspection, motor strength/tone normal, normal speech (for 

age)


Psychiatric:  normal inspection


Skin:  other - See above in head exam





Procedures


Laceration/Wound Repair


Laceration/Wound Repair :  


   Consent:  Verbal


   Wound Location:  head


   Wound's Depth, Shape:  superficial


   Wound Length (cm):  1


   Wound Explored:  clean


   Irrigated w/ Saline (ccs):  1000


   Wound Repaired With:  Dermabond


   Patient Tolerated:  Well


   Complications:  None





Medical Decision Making


Diagnostic Impression:  


 Primary Impression:  


 Occipital scalp laceration


 Additional Impression:  


 Closed head injury


ER Course


This patient has a clinical presentation consistent with minor head injury.  

There are no red flags on physical exam that would make me concerned for 

intracranial bleed.  I did educate the mother on the signs and symptoms of 

intracranial injury.  The mechanism was minor therefore I do not feel this 

patient needs head imaging at this time and that the mother can do monitoring 

at home.  The scalp laceration was approximated with Dermabond with excellent 

approximation.  The parent was given close return precautions and followup 

instructions.





Last Vital Signs








  Date Time  Temp Pulse Resp B/P (MAP) Pulse Ox O2 Delivery O2 Flow Rate FiO2


 


11/24/19 11:03 97.8 102 35 106/59 (75)    


 


11/24/19 10:47     97 Room Air  








Status:  improved


Disposition:  HOME, SELF-CARE


Condition:  Improved


Referrals:  


NOT CHOSEN IPA/MD,REFERRING (PCP)











Ni Wray DO Nov 24, 2019 12:02
no

## 2023-12-18 NOTE — OB RN TRIAGE NOTE - TEMPERATURE IN CELSIUS (DEGREES C)
NOTIFICATION OF RETURN TO WORK / SCHOOL 
 
5/31/2017 Mr. Tamika Lafleur [address] [place] [id number] To Whom It May Concern: 
 
Tamika Lafleur was under the care of [facility] He will return to work on 6/5/17 with no restrictions. If there are questions or concerns please have the patient contact our office. Sincerely, Tere Interiano NP 
 36.5

## 2023-12-18 NOTE — OB PROVIDER IHI INDUCTION/AUGMENTATION NOTE - NS_IHIPITOCINATTEND_OBGYN_ALL_OB_FT
1.  You were seen in the ENT Clinic today by Dr.Nissen. If you have any questions or concerns after your appointment, please call [phone]. Press option #1 for scheduling related needs. Press option #3 for Nurse advice.    2. Plan is to return to clinic in 2 years with new hearing test    Mallory Bernard LPN  [facility] - Otolaryngology       [facility]

## 2023-12-18 NOTE — OB RN PATIENT PROFILE - EDUCATION PROVIDED ON ASSESSMENT OF INFANT "FEEDING CUES" AND THE IMPORTANCE OF FEEDING "ON CUE" / "BABY-LED" FEEDINGS
Last visit 3/29/17  Next visit None pending. VM left by PSAR to schedule an appt.    Medication last prescribed 11/9/16 Alprazolam Xanax 0.25 mg tablet ,taking 1 tablet twice daily; Per note of 3/29/17 \"She will discontinue alprazolam and utilize lorazepam 0.5 mg up to twice a day as needed for anxiety.\" Verified prescription in Providers note.    Refill denied. Medication discontinued.   Statement Selected

## 2023-12-18 NOTE — CONSULT NOTE ADULT - ASSESSMENT
37 yo F  PMH inappropriate sinus tachycardia, admitted for IOL.     Review of Studies:  - ECG 2023: Sinus Tachycardia  - ECG 11/15/23: ST at 117bpm with No specific ST chnages  - ECG 23: ST at 124 bpm  - TTE 2023: The left ventricle is normal in size, wall thickness, and systolic function with a calculated ejection fraction of 55-60%. There are no regional wall motion abnormalities seen. There is normal left ventricular diastolic function and filling pressure.      #Inappropriate Sinus Tachycardia  - After delivery, place patient on telemetry and start Toprol 25mg  - Ensure outpatient follow-up with Dr Samantha Dimas (Appt scheduled Dec 21st)   39 yo F  PMH inappropriate sinus tachycardia, admitted for IOL.     Review of Studies:  - ECG 2023: Sinus Tachycardia  - ECG 11/15/23: ST at 117bpm with No specific ST chnages  - ECG 23: ST at 124 bpm  - TTE 2023: The left ventricle is normal in size, wall thickness, and systolic function with a calculated ejection fraction of 55-60%. There are no regional wall motion abnormalities seen. There is normal left ventricular diastolic function and filling pressure.      #Inappropriate Sinus Tachycardia  - After delivery, place patient on telemetry and start Toprol 25mg  - Ensure outpatient follow-up with Dr Samantha Dimas (Appt scheduled Dec 21st)

## 2023-12-18 NOTE — OB PROVIDER LABOR PROGRESS NOTE - ASSESSMENT
- Cat I FHT  - Reece balloon in place  - Pit at 2mu, cont as tolerated  - Dr. Castellanos in house  - Cont current management - Cat I FHT  - Reece balloon in place  - Pit at 2mu, cont as tolerated  - Amp for GBS ppx  - Dr. Castellanos in house  - Cont current management

## 2023-12-18 NOTE — OB RN PATIENT PROFILE - FALL HARM RISK - UNIVERSAL INTERVENTIONS
Bed in lowest position, wheels locked, appropriate side rails in place/Call bell, personal items and telephone in reach/Instruct patient to call for assistance before getting out of bed or chair/Non-slip footwear when patient is out of bed/[place] to call system/Physically safe environment - no spills, clutter or unnecessary equipment/Purposeful Proactive Rounding/Room/bathroom lighting operational, light cord in reach Bed in lowest position, wheels locked, appropriate side rails in place/Call bell, personal items and telephone in reach/Instruct patient to call for assistance before getting out of bed or chair/Non-slip footwear when patient is out of bed/[place] to call system/Physically safe environment - no spills, clutter or unnecessary equipment/Purposeful Proactive Rounding/Room/bathroom lighting operational, light cord in reach

## 2023-12-18 NOTE — OB RN TRIAGE NOTE - FALL HARM RISK - UNIVERSAL INTERVENTIONS
Bed in lowest position, wheels locked, appropriate side rails in place/Call bell, personal items and telephone in reach/Instruct patient to call for assistance before getting out of bed or chair/Non-slip footwear when patient is out of bed/[place] to call system/Physically safe environment - no spills, clutter or unnecessary equipment/Purposeful Proactive Rounding/Room/bathroom lighting operational, light cord in reach Bed in lowest position, wheels locked, appropriate side rails in place/Call bell, personal items and telephone in reach/Instruct patient to call for assistance before getting out of bed or chair/Non-slip footwear when patient is out of bed/[place] to call system/Physically safe environment - no spills, clutter or unnecessary equipment/Purposeful Proactive Rounding/Room/bathroom lighting operational, light cord in reach

## 2023-12-18 NOTE — OB PROVIDER IHI INDUCTION/AUGMENTATION NOTE - LABOR: [facility]
[x] [facility] [address] [facility]  [address]  [phone]  F: [phone]     Physical Therapy Daily Treatment Note    Date:  5/10/2023  Patient Name:  Luz Haley      :  1957   MRN: [id number]  Physician: Dr. Jose Canales,                             Insurance: [employer]vantage, [facility]  Medical Diagnosis: G71.266 (ICD-10-CM) - Status post surgery; M17.12 (ICD-10-CM) - Primary osteoarthritis of left knee  Rehab Codes: M 25.562, M 25.662, M 62.81, R 26.89, R 60.0  Onset date: 23                  Next Dr's appt.: 23      Visit# / total visits: 3/18  Cancels/No Shows: 0/0  Progress Note due at 10th visit    Subjective:    Pain:  [x] Yes  [] No Location: L knee   Pain Rating: (0-10 scale) 2/10  Pain altered Tx:  [x] No  [] Yes  Action:  Comments: Patient reports she is progressively feeling better. Was able to climb the stairs last night and sleep up in her bed for the first time since her surgery. Did not have to take pain medication last night either and reports pain is well controlled overall. Objective:  Modalities:  vasopneumatic cold/compression, 15 minutes, 38 degrees, low pressure, to left knee with leg on bolster.   Precautions:  Exercises:  Exercise Reps/ Time Weight/ Level Comments   NuStep 8 min Level 3          Standing      Calf Stretch on Incline 3x30\"  Added 5/10/23   Calf Raises 2x10  Added 5/10/23   DF Raises 2x10  Added 5/10/23   3 - Way Hip 10x ea  OKC  Added 5/10/23         Seated      Hamstring Stretch on Step Stool 3x30\"     Heel Slides w/towel on floor 2x10     LAQ 10x  Improvement from previous Tx         Supine      Quad Isometric 20x5\"  Educated in 30x/hr or as she is able   Hamstring Isometric over Bolster 10x5\"     SAQ w/bolster 10x AAROM    Heel Slides 10x AAROM    Bridge 10x           Prone      Prone lying with foot off EOB 2 min  Towel roll under thigh   TKE 2x10  Bolster under ankles - cues 1

## 2023-12-19 ENCOUNTER — APPOINTMENT (OUTPATIENT)
Dept: OBGYN | Facility: CLINIC | Age: 38
End: 2023-12-19

## 2023-12-19 LAB
T PALLIDUM AB TITR SER: NEGATIVE — SIGNIFICANT CHANGE UP
T PALLIDUM AB TITR SER: NEGATIVE — SIGNIFICANT CHANGE UP

## 2023-12-19 PROCEDURE — 59510 CESAREAN DELIVERY: CPT | Mod: U7,GC

## 2023-12-19 PROCEDURE — 88307 TISSUE EXAM BY PATHOLOGIST: CPT | Mod: 26

## 2023-12-19 RX ORDER — METOPROLOL TARTRATE 50 MG
25 TABLET ORAL EVERY 24 HOURS
Refills: 0 | Status: DISCONTINUED | OUTPATIENT
Start: 2023-12-20 | End: 2023-12-22

## 2023-12-19 RX ORDER — OXYCODONE HYDROCHLORIDE 5 MG/1
5 TABLET ORAL ONCE
Refills: 0 | Status: DISCONTINUED | OUTPATIENT
Start: 2023-12-19 | End: 2023-12-22

## 2023-12-19 RX ORDER — CITRIC ACID/SODIUM CITRATE 300-500 MG
30 SOLUTION, ORAL ORAL ONCE
Refills: 0 | Status: COMPLETED | OUTPATIENT
Start: 2023-12-19 | End: 2023-12-19

## 2023-12-19 RX ORDER — IBUPROFEN 200 MG
600 TABLET ORAL EVERY 6 HOURS
Refills: 0 | Status: COMPLETED | OUTPATIENT
Start: 2023-12-19 | End: 2024-11-16

## 2023-12-19 RX ORDER — ACETAMINOPHEN 500 MG
975 TABLET ORAL
Refills: 0 | Status: DISCONTINUED | OUTPATIENT
Start: 2023-12-19 | End: 2023-12-22

## 2023-12-19 RX ORDER — SODIUM CHLORIDE 9 MG/ML
1000 INJECTION, SOLUTION INTRAVENOUS
Refills: 0 | Status: DISCONTINUED | OUTPATIENT
Start: 2023-12-19 | End: 2023-12-22

## 2023-12-19 RX ORDER — AZITHROMYCIN 500 MG/1
500 TABLET, FILM COATED ORAL ONCE
Refills: 0 | Status: DISCONTINUED | OUTPATIENT
Start: 2023-12-19 | End: 2023-12-22

## 2023-12-19 RX ORDER — SIMETHICONE 80 MG/1
80 TABLET, CHEWABLE ORAL EVERY 4 HOURS
Refills: 0 | Status: DISCONTINUED | OUTPATIENT
Start: 2023-12-19 | End: 2023-12-22

## 2023-12-19 RX ORDER — MAGNESIUM HYDROXIDE 400 MG/1
30 TABLET, CHEWABLE ORAL
Refills: 0 | Status: DISCONTINUED | OUTPATIENT
Start: 2023-12-19 | End: 2023-12-22

## 2023-12-19 RX ORDER — ONDANSETRON 8 MG/1
8 TABLET, FILM COATED ORAL EVERY 8 HOURS
Refills: 0 | Status: DISCONTINUED | OUTPATIENT
Start: 2023-12-19 | End: 2023-12-22

## 2023-12-19 RX ORDER — CHLORHEXIDINE GLUCONATE 213 G/1000ML
1 SOLUTION TOPICAL DAILY
Refills: 0 | Status: DISCONTINUED | OUTPATIENT
Start: 2023-12-19 | End: 2023-12-22

## 2023-12-19 RX ORDER — TETANUS TOXOID, REDUCED DIPHTHERIA TOXOID AND ACELLULAR PERTUSSIS VACCINE, ADSORBED 5; 2.5; 8; 8; 2.5 [IU]/.5ML; [IU]/.5ML; UG/.5ML; UG/.5ML; UG/.5ML
0.5 SUSPENSION INTRAMUSCULAR ONCE
Refills: 0 | Status: DISCONTINUED | OUTPATIENT
Start: 2023-12-19 | End: 2023-12-22

## 2023-12-19 RX ORDER — CEFAZOLIN SODIUM 1 G
2000 VIAL (EA) INJECTION ONCE
Refills: 0 | Status: COMPLETED | OUTPATIENT
Start: 2023-12-19 | End: 2023-12-19

## 2023-12-19 RX ORDER — KETOROLAC TROMETHAMINE 30 MG/ML
30 SYRINGE (ML) INJECTION EVERY 6 HOURS
Refills: 0 | Status: DISCONTINUED | OUTPATIENT
Start: 2023-12-19 | End: 2023-12-20

## 2023-12-19 RX ORDER — LANOLIN
1 OINTMENT (GRAM) TOPICAL EVERY 6 HOURS
Refills: 0 | Status: DISCONTINUED | OUTPATIENT
Start: 2023-12-19 | End: 2023-12-22

## 2023-12-19 RX ORDER — FAMOTIDINE 10 MG/ML
20 INJECTION INTRAVENOUS ONCE
Refills: 0 | Status: COMPLETED | OUTPATIENT
Start: 2023-12-19 | End: 2023-12-19

## 2023-12-19 RX ORDER — ENOXAPARIN SODIUM 100 MG/ML
40 INJECTION SUBCUTANEOUS EVERY 24 HOURS
Refills: 0 | Status: DISCONTINUED | OUTPATIENT
Start: 2023-12-20 | End: 2023-12-22

## 2023-12-19 RX ORDER — DIPHENHYDRAMINE HCL 50 MG
25 CAPSULE ORAL EVERY 6 HOURS
Refills: 0 | Status: DISCONTINUED | OUTPATIENT
Start: 2023-12-19 | End: 2023-12-22

## 2023-12-19 RX ORDER — OXYCODONE HYDROCHLORIDE 5 MG/1
5 TABLET ORAL
Refills: 0 | Status: COMPLETED | OUTPATIENT
Start: 2023-12-19 | End: 2023-12-26

## 2023-12-19 RX ORDER — OXYTOCIN 10 UNIT/ML
333.33 VIAL (ML) INJECTION
Qty: 20 | Refills: 0 | Status: DISCONTINUED | OUTPATIENT
Start: 2023-12-19 | End: 2023-12-22

## 2023-12-19 RX ADMIN — Medication 108 GRAM(S): at 02:00

## 2023-12-19 RX ADMIN — FAMOTIDINE 20 MILLIGRAM(S): 10 INJECTION INTRAVENOUS at 20:46

## 2023-12-19 RX ADMIN — Medication 100 MILLIGRAM(S): at 20:46

## 2023-12-19 RX ADMIN — ONDANSETRON 8 MILLIGRAM(S): 8 TABLET, FILM COATED ORAL at 10:15

## 2023-12-19 RX ADMIN — Medication 108 GRAM(S): at 06:03

## 2023-12-19 RX ADMIN — Medication 108 GRAM(S): at 10:15

## 2023-12-19 RX ADMIN — Medication 30 MILLILITER(S): at 20:46

## 2023-12-19 RX ADMIN — Medication 108 GRAM(S): at 14:00

## 2023-12-19 NOTE — OB PROVIDER LABOR PROGRESS NOTE - ASSESSMENT
- Cat I FHT  - S/p kavitha  - Pt comfortable now that epidural reconnected  - Pit at 16mu, inc until adequate contractions  - Cont current management  - Dr. Castellanos in house

## 2023-12-19 NOTE — OB PROVIDER LABOR PROGRESS NOTE - ASSESSMENT
FHT reviewed. Baseline 125, moderate variability, +accels, no decels   TOCO: ctx 3 in 10min  cat I     - pitocin at 26  - IUPC in place  - continue to monitor

## 2023-12-19 NOTE — OB RN PREOPERATIVE CHECKLIST - NS_PREOPSPECIALEQ_OBGYN_ALL_OB
n/a Alert and oriented to person, place and time. Normal mood and affect, no apparent risk to self or others

## 2023-12-19 NOTE — OB PROVIDER DELIVERY SUMMARY - [email or web address]
04-Jan-2024 Rhofade Counseling: Rhofade is a topical medication which can decrease superficial blood flow where applied. Side effects are uncommon and include stinging, redness and allergic reactions. Rinvoq Pregnancy And Lactation Text: Based on animal studies, Rinvoq may cause embryo-fetal harm when administered to pregnant women.  The medication should not be used in pregnancy.  Breastfeeding is not recommended during treatment and for 6 days after the last dose. Rifampin Pregnancy And Lactation Text: This medication is Pregnancy Category C and it isn't know if it is safe during pregnancy. It is also excreted in breast milk and should not be used if you are breast feeding. Colchicine Pregnancy And Lactation Text: This medication is Pregnancy Category C and isn't considered safe during pregnancy. It is excreted in breast milk. Bactrim Pregnancy And Lactation Text: This medication is Pregnancy Category D and is known to cause fetal risk.  It is also excreted in breast milk. Methotrexate Counseling:  Patient counseled regarding adverse effects of methotrexate including but not limited to nausea, vomiting, abnormalities in liver function tests. Patients may develop mouth sores, rash, diarrhea, and abnormalities in blood counts. The patient understands that monitoring is required including LFT's and blood counts.  There is a rare possibility of scarring of the liver and lung problems that can occur when taking methotrexate. Persistent nausea, loss of appetite, pale stools, dark urine, cough, and shortness of breath should be reported immediately. Patient advised to discontinue methotrexate treatment at least three months before attempting to become pregnant.  I discussed the need for folate supplements while taking methotrexate.  These supplements can decrease side effects during methotrexate treatment. The patient verbalized understanding of the proper use and possible adverse effects of methotrexate.  All of the patient's questions and concerns were addressed. Topical Clindamycin Pregnancy And Lactation Text: This medication is Pregnancy Category B and is considered safe during pregnancy. It is unknown if it is excreted in breast milk. Olumiant Pregnancy And Lactation Text: Based on animal studies, Olumiant may cause embryo-fetal harm when administered to pregnant women.  The medication should not be used in pregnancy.  Breastfeeding is not recommended during treatment. 5-Fu Pregnancy And Lactation Text: This medication is Pregnancy Category X and contraindicated in pregnancy and in women who may become pregnant. It is unknown if this medication is excreted in breast milk. Itraconazole Counseling:  I discussed with the patient the risks of itraconazole including but not limited to liver damage, nausea/vomiting, neuropathy, and severe allergy.  The patient understands that this medication is best absorbed when taken with acidic beverages such as non-diet cola or ginger ale.  The patient understands that monitoring is required including baseline LFTs and repeat LFTs at intervals.  The patient understands that they are to contact us or the primary physician if concerning signs are noted. Erythromycin Counseling:  I discussed with the patient the risks of erythromycin including but not limited to GI upset, allergic reaction, drug rash, diarrhea, increase in liver enzymes, and yeast infections. Isotretinoin Pregnancy And Lactation Text: This medication is Pregnancy Category X and is considered extremely dangerous during pregnancy. It is unknown if it is excreted in breast milk. Nsaids Pregnancy And Lactation Text: These medications are considered safe up to 30 weeks gestation. It is excreted in breast milk. Gabapentin Counseling: I discussed with the patient the risks of gabapentin including but not limited to dizziness, somnolence, fatigue and ataxia. Tranexamic Acid Counseling:  Patient advised of the small risk of bleeding problems with tranexamic acid. They were also instructed to call if they developed any nausea, vomiting or diarrhea. All of the patient's questions and concerns were addressed. Benzoyl Peroxide Pregnancy And Lactation Text: This medication is Pregnancy Category C. It is unknown if benzoyl peroxide is excreted in breast milk. Zyclara Pregnancy And Lactation Text: This medication is Pregnancy Category C. It is unknown if this medication is excreted in breast milk. Propranolol Pregnancy And Lactation Text: This medication is Pregnancy Category C and it isn't known if it is safe during pregnancy. It is excreted in breast milk. Doxepin Pregnancy And Lactation Text: This medication is Pregnancy Category C and it isn't known if it is safe during pregnancy. It is also excreted in breast milk and breast feeding isn't recommended. Hydroquinone Pregnancy And Lactation Text: This medication has not been assigned a Pregnancy Risk Category but animal studies failed to show danger with the topical medication. It is unknown if the medication is excreted in breast milk. Methotrexate Pregnancy And Lactation Text: This medication is Pregnancy Category X and is known to cause fetal harm. This medication is excreted in breast milk. Topical Ketoconazole Counseling: Patient counseled that this medication may cause skin irritation or allergic reactions.  In the event of skin irritation, the patient was advised to reduce the amount of the drug applied or use it less frequently.   The patient verbalized understanding of the proper use and possible adverse effects of ketoconazole.  All of the patient's questions and concerns were addressed. Enbrel Counseling:  I discussed with the patient the risks of etanercept including but not limited to myelosuppression, immunosuppression, autoimmune hepatitis, demyelinating diseases, lymphoma, and infections.  The patient understands that monitoring is required including a PPD at baseline and must alert us or the primary physician if symptoms of infection or other concerning signs are noted. Drysol Counseling:  I discussed with the patient the risks of drysol/aluminum chloride including but not limited to skin rash, itching, irritation, burning. Stelara Pregnancy And Lactation Text: This medication is Pregnancy Category B and is considered safe during pregnancy. It is unknown if this medication is excreted in breast milk. Opzelura Counseling:  I discussed with the patient the risks of Opzelura including but not limited to nasopharngitis, bronchitis, ear infection, eosinophila, hives, diarrhea, folliculitis, tonsillitis, and rhinorrhea.  Taken orally, this medication has been linked to serious infections; higher rate of mortality; malignancy and lymphoproliferative disorders; major adverse cardiovascular events; thrombosis; thrombocytopenia, anemia, and neutropenia; and lipid elevations. Rituxan Counseling:  I discussed with the patient the risks of Rituxan infusions. Side effects can include infusion reactions, severe drug rashes including mucocutaneous reactions, reactivation of latent hepatitis and other infections and rarely progressive multifocal leukoencephalopathy.  All of the patient's questions and concerns were addressed. Erythromycin Pregnancy And Lactation Text: This medication is Pregnancy Category B and is considered safe during pregnancy. It is also excreted in breast milk. Odomzo Counseling- I discussed with the patient the risks of Odomzo including but not limited to nausea, vomiting, diarrhea, constipation, weight loss, changes in the sense of taste, decreased appetite, muscle spasms, and hair loss.  The patient verbalized understanding of the proper use and possible adverse effects of Odomzo.  All of the patient's questions and concerns were addressed. High Dose Vitamin A Counseling: Side effects reviewed, pt to contact office should one occur. Dapsone Counseling: I discussed with the patient the risks of dapsone including but not limited to hemolytic anemia, agranulocytosis, rashes, methemoglobinemia, kidney failure, peripheral neuropathy, headaches, GI upset, and liver toxicity.  Patients who start dapsone require monitoring including baseline LFTs and weekly CBCs for the first month, then every month thereafter.  The patient verbalized understanding of the proper use and possible adverse effects of dapsone.  All of the patient's questions and concerns were addressed. Sarecycline Counseling: Patient advised regarding possible photosensitivity and discoloration of the teeth, skin, lips, tongue and gums.  Patient instructed to avoid sunlight, if possible.  When exposed to sunlight, patients should wear protective clothing, sunglasses, and sunscreen.  The patient was instructed to call the office immediately if the following severe adverse effects occur:  hearing changes, easy bruising/bleeding, severe headache, or vision changes.  The patient verbalized understanding of the proper use and possible adverse effects of sarecycline.  All of the patient's questions and concerns were addressed. Rhofade Pregnancy And Lactation Text: This medication has not been assigned a Pregnancy Risk Category. It is unknown if the medication is excreted in breast milk. Carac Counseling:  I discussed with the patient the risks of Carac including but not limited to erythema, scaling, itching, weeping, crusting, and pain. Adbry Counseling: I discussed with the patient the risks of tralokinumab including but not limited to eye infection and irritation, cold sores, injection site reactions, worsening of asthma, allergic reactions and increased risk of parasitic infection.  Live vaccines should be avoided while taking tralokinumab. The patient understands that monitoring is required and they must alert us or the primary physician if symptoms of infection or other concerning signs are noted. Itraconazole Pregnancy And Lactation Text: This medication is Pregnancy Category C and it isn't know if it is safe during pregnancy. It is also excreted in breast milk. Azathioprine Counseling:  I discussed with the patient the risks of azathioprine including but not limited to myelosuppression, immunosuppression, hepatotoxicity, lymphoma, and infections.  The patient understands that monitoring is required including baseline LFTs, Creatinine, possible TPMP genotyping and weekly CBCs for the first month and then every 2 weeks thereafter.  The patient verbalized understanding of the proper use and possible adverse effects of azathioprine.  All of the patient's questions and concerns were addressed. Detail Level: Simple Hydroxyzine Counseling: Patient advised that the medication is sedating and not to drive a car after taking this medication.  Patient informed of potential adverse effects including but not limited to dry mouth, urinary retention, and blurry vision.  The patient verbalized understanding of the proper use and possible adverse effects of hydroxyzine.  All of the patient's questions and concerns were addressed. Drysol Pregnancy And Lactation Text: This medication is considered safe during pregnancy and breast feeding. Tranexamic Acid Pregnancy And Lactation Text: It is unknown if this medication is safe during pregnancy or breast feeding. Opzelura Pregnancy And Lactation Text: There is insufficient data to evaluate drug-associated risk for major birth defects, miscarriage, or other adverse maternal or fetal outcomes.  There is a pregnancy registry that monitors pregnancy outcomes in pregnant persons exposed to the medication during pregnancy.  It is unknown if this medication is excreted in breast milk.  Do not breastfeed during treatment and for about 4 weeks after the last dose. Opioid Counseling: I discussed with the patient the potential side effects of opioids including but not limited to addiction, altered mental status, and depression. I stressed avoiding alcohol, benzodiazepines, muscle relaxants and sleep aids unless specifically okayed by a physician. The patient verbalized understanding of the proper use and possible adverse effects of opioids. All of the patient's questions and concerns were addressed. They were instructed to flush the remaining pills down the toilet if they did not need them for pain. Prednisone Counseling:  I discussed with the patient the risks of prolonged use of prednisone including but not limited to weight gain, insomnia, osteoporosis, mood changes, diabetes, susceptibility to infection, glaucoma and high blood pressure.  In cases where prednisone use is prolonged, patients should be monitored with blood pressure checks, serum glucose levels and an eye exam.  Additionally, the patient may need to be placed on GI prophylaxis, PCP prophylaxis, and calcium and vitamin D supplementation and/or a bisphosphonate.  The patient verbalized understanding of the proper use and the possible adverse effects of prednisone.  All of the patient's questions and concerns were addressed. Odomzo Pregnancy And Lactation Text: This medication is Pregnancy Category X and is absolutely contraindicated during pregnancy. It is unknown if it is excreted in breast milk. Solaraze Counseling:  I discussed with the patient the risks of Solaraze including but not limited to erythema, scaling, itching, weeping, crusting, and pain. Sarecycline Pregnancy And Lactation Text: This medication is Pregnancy Category D and not consider safe during pregnancy. It is also excreted in breast milk. Taltz Counseling: I discussed with the patient the risks of ixekizumab including but not limited to immunosuppression, serious infections, worsening of inflammatory bowel disease and drug reactions.  The patient understands that monitoring is required including a PPD at baseline and must alert us or the primary physician if symptoms of infection or other concerning signs are noted. Imiquimod Counseling:  I discussed with the patient the risks of imiquimod including but not limited to erythema, scaling, itching, weeping, crusting, and pain.  Patient understands that the inflammatory response to imiquimod is variable from person to person and was educated regarded proper titration schedule.  If flu-like symptoms develop, patient knows to discontinue the medication and contact us. Birth Control Pills Counseling: Birth Control Pill Counseling: I discussed with the patient the potential side effects of OCPs including but not limited to increased risk of stroke, heart attack, thrombophlebitis, deep venous thrombosis, hepatic adenomas, breast changes, GI upset, headaches, and depression.  The patient verbalized understanding of the proper use and possible adverse effects of OCPs. All of the patient's questions and concerns were addressed. High Dose Vitamin A Pregnancy And Lactation Text: High dose vitamin A therapy is contraindicated during pregnancy and breast feeding. Humira Counseling:  I discussed with the patient the risks of adalimumab including but not limited to myelosuppression, immunosuppression, autoimmune hepatitis, demyelinating diseases, lymphoma, and serious infections.  The patient understands that monitoring is required including a PPD at baseline and must alert us or the primary physician if symptoms of infection or other concerning signs are noted. Include Pregnancy/Lactation Warning?: No Azathioprine Pregnancy And Lactation Text: This medication is Pregnancy Category D and isn't considered safe during pregnancy. It is unknown if this medication is excreted in breast milk. Rituxan Pregnancy And Lactation Text: This medication is Pregnancy Category C and it isn't know if it is safe during pregnancy. It is unknown if this medication is excreted in breast milk but similar antibodies are known to be excreted. Glycopyrrolate Counseling:  I discussed with the patient the risks of glycopyrrolate including but not limited to skin rash, drowsiness, dry mouth, difficulty urinating, and blurred vision. Metronidazole Counseling:  I discussed with the patient the risks of metronidazole including but not limited to seizures, nausea/vomiting, a metallic taste in the mouth, nausea/vomiting and severe allergy. Valtrex Counseling: I discussed with the patient the risks of valacyclovir including but not limited to kidney damage, nausea, vomiting and severe allergy.  The patient understands that if the infection seems to be worsening or is not improving, they are to call. Ketoconazole Counseling:   Patient counseled regarding improving absorption with orange juice.  Adverse effects include but are not limited to breast enlargement, headache, diarrhea, nausea, upset stomach, liver function test abnormalities, taste disturbance, and stomach pain.  There is a rare possibility of liver failure that can occur when taking ketoconazole. The patient understands that monitoring of LFTs may be required, especially at baseline. The patient verbalized understanding of the proper use and possible adverse effects of ketoconazole.  All of the patient's questions and concerns were addressed. Adbry Pregnancy And Lactation Text: It is unknown if this medication will adversely affect pregnancy or breast feeding. Dapsone Pregnancy And Lactation Text: This medication is Pregnancy Category C and is not considered safe during pregnancy or breast feeding. Taltz Pregnancy And Lactation Text: The risk during pregnancy and breastfeeding is uncertain with this medication. Birth Control Pills Pregnancy And Lactation Text: This medication should be avoided if pregnant and for the first 30 days post-partum. Opioid Pregnancy And Lactation Text: These medications can lead to premature delivery and should be avoided during pregnancy. These medications are also present in breast milk in small amounts. Solaraze Pregnancy And Lactation Text: This medication is Pregnancy Category B and is considered safe. There is some data to suggest avoiding during the third trimester. It is unknown if this medication is excreted in breast milk. Picato Counseling:  I discussed with the patient the risks of Picato including but not limited to erythema, scaling, itching, weeping, crusting, and pain. Hydroxyzine Pregnancy And Lactation Text: This medication is not safe during pregnancy and should not be taken. It is also excreted in breast milk and breast feeding isn't recommended. Elidel Counseling: Patient may experience a mild burning sensation during topical application. Elidel is not approved in children less than 2 years of age. There have been case reports of hematologic and skin malignancies in patients using topical calcineurin inhibitors although causality is questionable. Topical Sulfur Applications Counseling: Topical Sulfur Counseling: Patient counseled that this medication may cause skin irritation or allergic reactions.  In the event of skin irritation, the patient was advised to reduce the amount of the drug applied or use it less frequently.   The patient verbalized understanding of the proper use and possible adverse effects of topical sulfur application.  All of the patient's questions and concerns were addressed. Tetracycline Counseling: Patient counseled regarding possible photosensitivity and increased risk for sunburn.  Patient instructed to avoid sunlight, if possible.  When exposed to sunlight, patients should wear protective clothing, sunglasses, and sunscreen.  The patient was instructed to call the office immediately if the following severe adverse effects occur:  hearing changes, easy bruising/bleeding, severe headache, or vision changes.  The patient verbalized understanding of the proper use and possible adverse effects of tetracycline.  All of the patient's questions and concerns were addressed. Patient understands to avoid pregnancy while on therapy due to potential birth defects. Cellcept Counseling:  I discussed with the patient the risks of mycophenolate mofetil including but not limited to infection/immunosuppression, GI upset, hypokalemia, hypercholesterolemia, bone marrow suppression, lymphoproliferative disorders, malignancy, GI ulceration/bleed/perforation, colitis, interstitial lung disease, kidney failure, progressive multifocal leukoencephalopathy, and birth defects.  The patient understands that monitoring is required including a baseline creatinine and regular CBC testing. In addition, patient must alert us immediately if symptoms of infection or other concerning signs are noted. Metronidazole Pregnancy And Lactation Text: This medication is Pregnancy Category B and considered safe during pregnancy.  It is also excreted in breast milk. Oral Minoxidil Counseling- I discussed with the patient the risks of oral minoxidil including but not limited to shortness of breath, swelling of the feet or ankles, dizziness, lightheadedness, unwanted hair growth and allergic reaction.  The patient verbalized understanding of the proper use and possible adverse effects of oral minoxidil.  All of the patient's questions and concerns were addressed. Prednisone Pregnancy And Lactation Text: This medication is Pregnancy Category C and it isn't know if it is safe during pregnancy. This medication is excreted in breast milk. Siliq Counseling:  I discussed with the patient the risks of Siliq including but not limited to new or worsening depression, suicidal thoughts and behavior, immunosuppression, malignancy, posterior leukoencephalopathy syndrome, and serious infections.  The patient understands that monitoring is required including a PPD at baseline and must alert us or the primary physician if symptoms of infection or other concerning signs are noted. There is also a special program designed to monitor depression which is required with Siliq. Glycopyrrolate Pregnancy And Lactation Text: This medication is Pregnancy Category B and is considered safe during pregnancy. It is unknown if it is excreted breast milk. Ketoconazole Pregnancy And Lactation Text: This medication is Pregnancy Category C and it isn't know if it is safe during pregnancy. It is also excreted in breast milk and breast feeding isn't recommended. Cephalexin Counseling: I counseled the patient regarding use of cephalexin as an antibiotic for prophylactic and/or therapeutic purposes. Cephalexin (commonly prescribed under brand name Keflex) is a cephalosporin antibiotic which is active against numerous classes of bacteria, including most skin bacteria. Side effects may include nausea, diarrhea, gastrointestinal upset, rash, hives, yeast infections, and in rare cases, hepatitis, kidney disease, seizures, fever, confusion, neurologic symptoms, and others. Patients with severe allergies to penicillin medications are cautioned that there is about a 10% incidence of cross-reactivity with cephalosporins. When possible, patients with penicillin allergies should use alternatives to cephalosporins for antibiotic therapy. Cibinqo Counseling: I discussed with the patient the risks of Cibinqo therapy including but not limited to common cold, nausea, headache, cold sores, increased blood CPK levels, dizziness, UTIs, fatigue, acne, and vomitting. Live vaccines should be avoided.  This medication has been linked to serious infections; higher rate of mortality; malignancy and lymphoproliferative disorders; major adverse cardiovascular events; thrombosis; thrombocytopenia and lymphopenia; lipid elevations; and retinal detachment. Dutasteride Counseling: Dustasteride Counseling:  I discussed with the patient the risks of use of dutasteride including but not limited to decreased libido, decreased ejaculate volume, and gynecomastia. Women who can become pregnant should not handle medication.  All of the patient's questions and concerns were addressed. Spironolactone Counseling: Patient advised regarding risks of diarrhea, abdominal pain, hyperkalemia, birth defects (for female patients), liver toxicity and renal toxicity. The patient may need blood work to monitor liver and kidney function and potassium levels while on therapy. The patient verbalized understanding of the proper use and possible adverse effects of spironolactone.  All of the patient's questions and concerns were addressed. Topical Sulfur Applications Pregnancy And Lactation Text: This medication is considered safe during pregnancy and breast feeding secondary to limited systemic absorption. Valtrex Pregnancy And Lactation Text: this medication is Pregnancy Category B and is considered safe during pregnancy. This medication is not directly found in breast milk but it's metabolite acyclovir is present. Calcipotriene Counseling: Cantharidin Counseling:  I discussed with the patient the risks of Cantharidin including but not limited to pain, redness, burning, itching, and blistering. Topical Retinoid counseling:  Patient advised to apply a pea-sized amount only at bedtime and wait 30 minutes after washing their face before applying.  If too drying, patient may add a non-comedogenic moisturizer. The patient verbalized understanding of the proper use and possible adverse effects of retinoids.  All of the patient's questions and concerns were addressed. Klisyri Counseling:  I discussed with the patient the risks of Klisyri including but not limited to erythema, scaling, itching, weeping, crusting, and pain. Tremfya Counseling: I discussed with the patient the risks of guselkumab including but not limited to immunosuppression, serious infections, and drug reactions.  The patient understands that monitoring is required including a PPD at baseline and must alert us or the primary physician if symptoms of infection or other concerning signs are noted. Terbinafine Counseling: Patient counseling regarding adverse effects of terbinafine including but not limited to headache, diarrhea, rash, upset stomach, liver function test abnormalities, itching, taste/smell disturbance, nausea, abdominal pain, and flatulence.  There is a rare possibility of liver failure that can occur when taking terbinafine.  The patient understands that a baseline LFT and kidney function test may be required. The patient verbalized understanding of the proper use and possible adverse effects of terbinafine.  All of the patient's questions and concerns were addressed. Minocycline Counseling: Patient advised regarding possible photosensitivity and discoloration of the teeth, skin, lips, tongue and gums.  Patient instructed to avoid sunlight, if possible.  When exposed to sunlight, patients should wear protective clothing, sunglasses, and sunscreen.  The patient was instructed to call the office immediately if the following severe adverse effects occur:  hearing changes, easy bruising/bleeding, severe headache, or vision changes.  The patient verbalized understanding of the proper use and possible adverse effects of minocycline.  All of the patient's questions and concerns were addressed. Cephalexin Pregnancy And Lactation Text: This medication is Pregnancy Category B and considered safe during pregnancy.  It is also excreted in breast milk but can be used safely for shorter doses. Oral Minoxidil Pregnancy And Lactation Text: This medication should only be used when clearly needed if you are pregnant, attempting to become pregnant or breast feeding. Cibinqo Pregnancy And Lactation Text: It is unknown if this medication will adversely affect pregnancy or breast feeding.  You should not take this medication if you are currently pregnant or planning a pregnancy or while breastfeeding. Wartpeel Counseling:  I discussed with the patient the risks of Wartpeel including but not limited to erythema, scaling, itching, weeping, crusting, and pain. Hydroxychloroquine Counseling:  I discussed with the patient that a baseline ophthalmologic exam is needed at the start of therapy and every year thereafter while on therapy. A CBC may also be warranted for monitoring.  The side effects of this medication were discussed with the patient, including but not limited to agranulocytosis, aplastic anemia, seizures, rashes, retinopathy, and liver toxicity. Patient instructed to call the office should any adverse effect occur.  The patient verbalized understanding of the proper use and possible adverse effects of Plaquenil.  All the patient's questions and concerns were addressed. Calcipotriene Pregnancy And Lactation Text: This medication has not been proven safe during pregnancy. It is unknown if this medication is excreted in breast milk. Acitretin Counseling:  I discussed with the patient the risks of acitretin including but not limited to hair loss, dry lips/skin/eyes, liver damage, hyperlipidemia, depression/suicidal ideation, photosensitivity.  Serious rare side effects can include but are not limited to pancreatitis, pseudotumor cerebri, bony changes, clot formation/stroke/heart attack.  Patient understands that alcohol is contraindicated since it can result in liver toxicity and significantly prolong the elimination of the drug by many years. Albendazole Counseling:  I discussed with the patient the risks of albendazole including but not limited to cytopenia, kidney damage, nausea/vomiting and severe allergy.  The patient understands that this medication is being used in an off-label manner. Klisyri Pregnancy And Lactation Text: It is unknown if this medication can harm a developing fetus or if it is excreted in breast milk. Arava Counseling:  Patient counseled regarding adverse effects of Arava including but not limited to nausea, vomiting, abnormalities in liver function tests. Patients may develop mouth sores, rash, diarrhea, and abnormalities in blood counts. The patient understands that monitoring is required including LFTs and blood counts.  There is a rare possibility of scarring of the liver and lung problems that can occur when taking methotrexate. Persistent nausea, loss of appetite, pale stools, dark urine, cough, and shortness of breath should be reported immediately. Patient advised to discontinue Arava treatment and consult with a physician prior to attempting conception. The patient will have to undergo a treatment to eliminate Arava from the body prior to conception. Simponi Counseling:  I discussed with the patient the risks of golimumab including but not limited to myelosuppression, immunosuppression, autoimmune hepatitis, demyelinating diseases, lymphoma, and serious infections.  The patient understands that monitoring is required including a PPD at baseline and must alert us or the primary physician if symptoms of infection or other concerning signs are noted. Dutasteride Pregnancy And Lactation Text: This medication is absolutely contraindicated in women, especially during pregnancy and breast feeding. Feminization of male fetuses is possible if taking while pregnant. Eucrisa Counseling: Patient may experience a mild burning sensation during topical application. Eucrisa is not approved in children less than 3 months of age. Ilumya Counseling: I discussed with the patient the risks of tildrakizumab including but not limited to immunosuppression, malignancy, posterior leukoencephalopathy syndrome, and serious infections.  The patient understands that monitoring is required including a PPD at baseline and must alert us or the primary physician if symptoms of infection or other concerning signs are noted. Aklief counseling:  Patient advised to apply a pea-sized amount only at bedtime and wait 30 minutes after washing their face before applying.  If too drying, patient may add a non-comedogenic moisturizer.  The most commonly reported side effects including irritation, redness, scaling, dryness, stinging, burning, itching, and increased risk of sunburn.  The patient verbalized understanding of the proper use and possible adverse effects of retinoids.  All of the patient's questions and concerns were addressed. Protopic Counseling: Patient may experience a mild burning sensation during topical application. Protopic is not approved in children less than 2 years of age. There have been case reports of hematologic and skin malignancies in patients using topical calcineurin inhibitors although causality is questionable. Otezla Counseling: The side effects of Otezla were discussed with the patient, including but not limited to worsening or new depression, weight loss, diarrhea, nausea, upper respiratory tract infection, and headache. Patient instructed to call the office should any adverse effect occur.  The patient verbalized understanding of the proper use and possible adverse effects of Otezla.  All the patient's questions and concerns were addressed. Clindamycin Counseling: I counseled the patient regarding use of clindamycin as an antibiotic for prophylactic and/or therapeutic purposes. Clindamycin is active against numerous classes of bacteria, including skin bacteria. Side effects may include nausea, diarrhea, gastrointestinal upset, rash, hives, yeast infections, and in rare cases, colitis. Albendazole Pregnancy And Lactation Text: This medication is Pregnancy Category C and it isn't known if it is safe during pregnancy. It is also excreted in breast milk. Erivedge Counseling- I discussed with the patient the risks of Erivedge including but not limited to nausea, vomiting, diarrhea, constipation, weight loss, changes in the sense of taste, decreased appetite, muscle spasms, and hair loss.  The patient verbalized understanding of the proper use and possible adverse effects of Erivedge.  All of the patient's questions and concerns were addressed. Cyclophosphamide Counseling:  I discussed with the patient the risks of cyclophosphamide including but not limited to hair loss, hormonal abnormalities, decreased fertility, abdominal pain, diarrhea, nausea and vomiting, bone marrow suppression and infection. The patient understands that monitoring is required while taking this medication. Spironolactone Pregnancy And Lactation Text: This medication can cause feminization of the male fetus and should be avoided during pregnancy. The active metabolite is also found in breast milk. Cantharidin Counseling: Calcipotriene Counseling:  I discussed with the patient the risks of calcipotriene including but not limited to erythema, scaling, itching, and irritation. Fluconazole Counseling:  Patient counseled regarding adverse effects of fluconazole including but not limited to headache, diarrhea, nausea, upset stomach, liver function test abnormalities, taste disturbance, and stomach pain.  There is a rare possibility of liver failure that can occur when taking fluconazole.  The patient understands that monitoring of LFTs and kidney function test may be required, especially at baseline. The patient verbalized understanding of the proper use and possible adverse effects of fluconazole.  All of the patient's questions and concerns were addressed. Acitretin Pregnancy And Lactation Text: This medication is Pregnancy Category X and should not be given to women who are pregnant or may become pregnant in the future. This medication is excreted in breast milk. Terbinafine Pregnancy And Lactation Text: This medication is Pregnancy Category B and is considered safe during pregnancy. It is also excreted in breast milk and breast feeding isn't recommended. Xeljanz Counseling: I discussed with the patient the risks of Xeljanz therapy including increased risk of infection, liver issues, headache, diarrhea, or cold symptoms. Live vaccines should be avoided. They were instructed to call if they have any problems. Cimzia Counseling:  I discussed with the patient the risks of Cimzia including but not limited to immunosuppression, allergic reactions and infections.  The patient understands that monitoring is required including a PPD at baseline and must alert us or the primary physician if symptoms of infection or other concerning signs are noted. Protopic Pregnancy And Lactation Text: This medication is Pregnancy Category C. It is unknown if this medication is excreted in breast milk when applied topically. Aklief Pregnancy And Lactation Text: It is unknown if this medication is safe to use during pregnancy.  It is unknown if this medication is excreted in breast milk.  Breastfeeding women should use the topical cream on the smallest area of the skin for the shortest time needed while breastfeeding.  Do not apply to nipple and areola. Otezla Pregnancy And Lactation Text: This medication is Pregnancy Category C and it isn't known if it is safe during pregnancy. It is unknown if it is excreted in breast milk. Azithromycin Counseling:  I discussed with the patient the risks of azithromycin including but not limited to GI upset, allergic reaction, drug rash, diarrhea, and yeast infections. Tazorac Counseling:  Patient advised that medication is irritating and drying.  Patient may need to apply sparingly and wash off after an hour before eventually leaving it on overnight.  The patient verbalized understanding of the proper use and possible adverse effects of tazorac.  All of the patient's questions and concerns were addressed. SSKI Counseling:  I discussed with the patient the risks of SSKI including but not limited to thyroid abnormalities, metallic taste, GI upset, fever, headache, acne, arthralgias, paraesthesias, lymphadenopathy, easy bleeding, arrhythmias, and allergic reaction. Hydroxychloroquine Pregnancy And Lactation Text: This medication has been shown to cause fetal harm but it isn't assigned a Pregnancy Risk Category. There are small amounts excreted in breast milk. Minoxidil Counseling: Minoxidil is a topical medication which can increase blood flow where it is applied. It is uncertain how this medication increases hair growth. Side effects are uncommon and include stinging and allergic reactions. Infliximab Counseling:  I discussed with the patient the risks of infliximab including but not limited to myelosuppression, immunosuppression, autoimmune hepatitis, demyelinating diseases, lymphoma, and serious infections.  The patient understands that monitoring is required including a PPD at baseline and must alert us or the primary physician if symptoms of infection or other concerning signs are noted. Clindamycin Pregnancy And Lactation Text: This medication can be used in pregnancy if certain situations. Clindamycin is also present in breast milk. Clofazimine Counseling:  I discussed with the patient the risks of clofazimine including but not limited to skin and eye pigmentation, liver damage, nausea/vomiting, gastrointestinal bleeding and allergy. Cyclophosphamide Pregnancy And Lactation Text: This medication is Pregnancy Category D and it isn't considered safe during pregnancy. This medication is excreted in breast milk. Libtayo Counseling- I discussed with the patient the risks of Libtayo including but not limited to nausea, vomiting, diarrhea, and bone or muscle pain.  The patient verbalized understanding of the proper use and possible adverse effects of Libtayo.  All of the patient's questions and concerns were addressed. Quinolones Counseling:  I discussed with the patient the risks of fluoroquinolones including but not limited to GI upset, allergic reaction, drug rash, diarrhea, dizziness, photosensitivity, yeast infections, liver function test abnormalities, tendonitis/tendon rupture. Qbrexza Counseling:  I discussed with the patient the risks of Qbrexza including but not limited to headache, mydriasis, blurred vision, dry eyes, nasal dryness, dry mouth, dry throat, dry skin, urinary hesitation, and constipation.  Local skin reactions including erythema, burning, stinging, and itching can also occur. Ivermectin Counseling:  Patient instructed to take medication on an empty stomach with a full glass of water.  Patient informed of potential adverse effects including but not limited to nausea, diarrhea, dizziness, itching, and swelling of the extremities or lymph nodes.  The patient verbalized understanding of the proper use and possible adverse effects of ivermectin.  All of the patient's questions and concerns were addressed. Bexarotene Counseling:  I discussed with the patient the risks of bexarotene including but not limited to hair loss, dry lips/skin/eyes, liver abnormalities, hyperlipidemia, pancreatitis, depression/suicidal ideation, photosensitivity, drug rash/allergic reactions, hypothyroidism, anemia, leukopenia, infection, cataracts, and teratogenicity.  Patient understands that they will need regular blood tests to check lipid profile, liver function tests, white blood cell count, thyroid function tests and pregnancy test if applicable. Xelroxanez Pregnancy And Lactation Text: This medication is Pregnancy Category D and is not considered safe during pregnancy.  The risk during breast feeding is also uncertain. Cimetidine Counseling:  I discussed with the patient the risks of Cimetidine including but not limited to gynecomastia, headache, diarrhea, nausea, drowsiness, arrhythmias, pancreatitis, skin rashes, psychosis, bone marrow suppression and kidney toxicity. Cantharidin Pregnancy And Lactation Text: The use of this medication during pregnancy or lactation is not recommended as there is insufficient data. Tazorac Pregnancy And Lactation Text: This medication is not safe during pregnancy. It is unknown if this medication is excreted in breast milk. Azelaic Acid Counseling: Patient counseled that medicine may cause skin irritation and to avoid applying near the eyes.  In the event of skin irritation, the patient was advised to reduce the amount of the drug applied or use it less frequently.   The patient verbalized understanding of the proper use and possible adverse effects of azelaic acid.  All of the patient's questions and concerns were addressed. Sski Pregnancy And Lactation Text: This medication is Pregnancy Category D and isn't considered safe during pregnancy. It is excreted in breast milk. Hydroquinone Counseling:  Patient advised that medication may result in skin irritation, lightening (hypopigmentation), dryness, and burning.  In the event of skin irritation, the patient was advised to reduce the amount of the drug applied or use it less frequently.  Rarely, spots that are treated with hydroquinone can become darker (pseudoochronosis).  Should this occur, patient instructed to stop medication and call the office. The patient verbalized understanding of the proper use and possible adverse effects of hydroquinone.  All of the patient's questions and concerns were addressed. Winlevi Counseling:  I discussed with the patient the risks of topical clascoterone including but not limited to erythema, scaling, itching, and stinging. Patient voiced their understanding. Cyclosporine Counseling:  I discussed with the patient the risks of cyclosporine including but not limited to hypertension, gingival hyperplasia,myelosuppression, immunosuppression, liver damage, kidney damage, neurotoxicity, lymphoma, and serious infections. The patient understands that monitoring is required including baseline blood pressure, CBC, CMP, lipid panel and uric acid, and then 1-2 times monthly CMP and blood pressure. Cimzia Pregnancy And Lactation Text: This medication crosses the placenta but can be considered safe in certain situations. Cimzia may be excreted in breast milk. Azithromycin Pregnancy And Lactation Text: This medication is considered safe during pregnancy and is also secreted in breast milk. Skyrizi Counseling: I discussed with the patient the risks of risankizumab-rzaa including but not limited to immunosuppression, and serious infections.  The patient understands that monitoring is required including a PPD at baseline and must alert us or the primary physician if symptoms of infection or other concerning signs are noted. Oxybutynin Counseling:  I discussed with the patient the risks of oxybutynin including but not limited to skin rash, drowsiness, dry mouth, difficulty urinating, and blurred vision. Bexarotene Pregnancy And Lactation Text: This medication is Pregnancy Category X and should not be given to women who are pregnant or may become pregnant. This medication should not be used if you are breast feeding. Niacinamide Pregnancy And Lactation Text: These medications are considered safe during pregnancy. Dupixent Counseling: I discussed with the patient the risks of dupilumab including but not limited to eye infection and irritation, cold sores, injection site reactions, worsening of asthma, allergic reactions and increased risk of parasitic infection.  Live vaccines should be avoided while taking dupilumab. Dupilumab will also interact with certain medications such as warfarin and cyclosporine. The patient understands that monitoring is required and they must alert us or the primary physician if symptoms of infection or other concerning signs are noted. Libtayo Pregnancy And Lactation Text: This medication is contraindicated in pregnancy and when breast feeding. Cosentyx Counseling:  I discussed with the patient the risks of Cosentyx including but not limited to worsening of Crohn's disease, immunosuppression, allergic reactions and infections.  The patient understands that monitoring is required including a PPD at baseline and must alert us or the primary physician if symptoms of infection or other concerning signs are noted. Doxycycline Counseling:  Patient counseled regarding possible photosensitivity and increased risk for sunburn.  Patient instructed to avoid sunlight, if possible.  When exposed to sunlight, patients should wear protective clothing, sunglasses, and sunscreen.  The patient was instructed to call the office immediately if the following severe adverse effects occur:  hearing changes, easy bruising/bleeding, severe headache, or vision changes.  The patient verbalized understanding of the proper use and possible adverse effects of doxycycline.  All of the patient's questions and concerns were addressed. Qbrexza Pregnancy And Lactation Text: There is no available data on Qbrexza use in pregnant women.  There is no available data on Qbrexza use in lactation. Finasteride Counseling:  I discussed with the patient the risks of use of finasteride including but not limited to decreased libido, decreased ejaculate volume, gynecomastia, and depression. Women should not handle medication.  All of the patient's questions and concerns were addressed. Thalidomide Counseling: I discussed with the patient the risks of thalidomide including but not limited to birth defects, anxiety, weakness, chest pain, dizziness, cough and severe allergy. Winlevi Pregnancy And Lactation Text: This medication is considered safe during pregnancy and breastfeeding. Griseofulvin Counseling:  I discussed with the patient the risks of griseofulvin including but not limited to photosensitivity, cytopenia, liver damage, nausea/vomiting and severe allergy.  The patient understands that this medication is best absorbed when taken with a fatty meal (e.g., ice cream or french fries). 5-Fu Counseling: 5-Fluorouracil Counseling:  I discussed with the patient the risks of 5-fluorouracil including but not limited to erythema, scaling, itching, weeping, crusting, and pain. Bactrim Counseling:  I discussed with the patient the risks of sulfa antibiotics including but not limited to GI upset, allergic reaction, drug rash, diarrhea, dizziness, photosensitivity, and yeast infections.  Rarely, more serious reactions can occur including but not limited to aplastic anemia, agranulocytosis, methemoglobinemia, blood dyscrasias, liver or kidney failure, lung infiltrates or desquamative/blistering drug rashes. Mirvaso Counseling: Mirvaso is a topical medication which can decrease superficial blood flow where applied. Side effects are uncommon and include stinging, redness and allergic reactions. Xolair Counseling:  Patient informed of potential adverse effects including but not limited to fever, muscle aches, rash and allergic reactions.  The patient verbalized understanding of the proper use and possible adverse effects of Xolair.  All of the patient's questions and concerns were addressed. Topical Clindamycin Counseling: Patient counseled that this medication may cause skin irritation or allergic reactions.  In the event of skin irritation, the patient was advised to reduce the amount of the drug applied or use it less frequently.   The patient verbalized understanding of the proper use and possible adverse effects of clindamycin.  All of the patient's questions and concerns were addressed. Nsaids Counseling: NSAID Counseling: I discussed with the patient that NSAIDs should be taken with food. Prolonged use of NSAIDs can result in the development of stomach ulcers.  Patient advised to stop taking NSAIDs if abdominal pain occurs.  The patient verbalized understanding of the proper use and possible adverse effects of NSAIDs.  All of the patient's questions and concerns were addressed. Rifampin Counseling: I discussed with the patient the risks of rifampin including but not limited to liver damage, kidney damage, red-orange body fluids, nausea/vomiting and severe allergy. Doxycycline Pregnancy And Lactation Text: This medication is Pregnancy Category D and not consider safe during pregnancy. It is also excreted in breast milk but is considered safe for shorter treatment courses. Rinvoq Counseling: I discussed with the patient the risks of Rinvoq therapy including but not limited to upper respiratory tract infections, shingles, cold sores, bronchitis, nausea, cough, fever, acne, and headache. Live vaccines should be avoided.  This medication has been linked to serious infections; higher rate of mortality; malignancy and lymphoproliferative disorders; major adverse cardiovascular events; thrombosis; thrombocytopenia, anemia, and neutropenia; lipid elevations; liver enzyme elevations; and gastrointestinal perforations. Zyclara Counseling:  I discussed with the patient the risks of imiquimod including but not limited to erythema, scaling, itching, weeping, crusting, and pain.  Patient understands that the inflammatory response to imiquimod is variable from person to person and was educated regarded proper titration schedule.  If flu-like symptoms develop, patient knows to discontinue the medication and contact us. Griseofulvin Pregnancy And Lactation Text: This medication is Pregnancy Category X and is known to cause serious birth defects. It is unknown if this medication is excreted in breast milk but breast feeding should be avoided. Isotretinoin Counseling: Patient should get monthly blood tests, not donate blood, not drive at night if vision affected, not share medication, and not undergo elective surgery for 6 months after tx completed. Side effects reviewed, pt to contact office should one occur. Finasteride Pregnancy And Lactation Text: This medication is absolutely contraindicated during pregnancy. It is unknown if it is excreted in breast milk. Colchicine Counseling:  Patient counseled regarding adverse effects including but not limited to stomach upset (nausea, vomiting, stomach pain, or diarrhea).  Patient instructed to limit alcohol consumption while taking this medication.  Colchicine may reduce blood counts especially with prolonged use.  The patient understands that monitoring of kidney function and blood counts may be required, especially at baseline. The patient verbalized understanding of the proper use and possible adverse effects of colchicine.  All of the patient's questions and concerns were addressed. Stelara Counseling:  I discussed with the patient the risks of ustekinumab including but not limited to immunosuppression, malignancy, posterior leukoencephalopathy syndrome, and serious infections.  The patient understands that monitoring is required including a PPD at baseline and must alert us or the primary physician if symptoms of infection or other concerning signs are noted. Propranolol Counseling:  I discussed with the patient the risks of propranolol including but not limited to low heart rate, low blood pressure, low blood sugar, restlessness and increased cold sensitivity. They should call the office if they experience any of these side effects. Doxepin Counseling:  Patient advised that the medication is sedating and not to drive a car after taking this medication. Patient informed of potential adverse effects including but not limited to dry mouth, urinary retention, and blurry vision.  The patient verbalized understanding of the proper use and possible adverse effects of doxepin.  All of the patient's questions and concerns were addressed. Niacinamide Counseling: I recommended taking niacin or niacinamide, also know as vitamin B3, twice daily. Recent evidence suggests that taking vitamin B3 (500 mg twice daily) can reduce the risk of actinic keratoses and non-melanoma skin cancers. Side effects of vitamin B3 include flushing and headache. Dupixent Pregnancy And Lactation Text: This medication likely crosses the placenta but the risk for the fetus is uncertain. This medication is excreted in breast milk. Olumiant Counseling: I discussed with the patient the risks of Olumiant therapy including but not limited to upper respiratory tract infections, shingles, cold sores, and nausea. Live vaccines should be avoided.  This medication has been linked to serious infections; higher rate of mortality; malignancy and lymphoproliferative disorders; major adverse cardiovascular events; thrombosis; gastrointestinal perforations; neutropenia; lymphopenia; anemia; liver enzyme elevations; and lipid elevations. Xolair Pregnancy And Lactation Text: This medication is Pregnancy Category B and is considered safe during pregnancy. This medication is excreted in breast milk. Benzoyl Peroxide Counseling: Patient counseled that medicine may cause skin irritation and bleach clothing.  In the event of skin irritation, the patient was advised to reduce the amount of the drug applied or use it less frequently.   The patient verbalized understanding of the proper use and possible adverse effects of benzoyl peroxide.  All of the patient's questions and concerns were addressed.

## 2023-12-19 NOTE — OB PROVIDER LABOR PROGRESS NOTE - ASSESSMENT
pt repositioned to [facility]  plan to reexamine in 1-2 hours and AROM at that time if head applied  continue to monitor pt repositioned to [facility]  plan to reexamine in 1-2 hours and AROM at that time if head applied  continue to monitor

## 2023-12-19 NOTE — OB PROVIDER DELIVERY SUMMARY - NSSELHIDDEN_OBGYN_ALL_OB_FT
[NS_DeliveryAttending1_OBGYN_ALL_OB_FT:[id number]==],[NS_DeliveryRN_OBGYN_ALL_OB_FT:[id number]=] [NS_DeliveryAttending1_OBGYN_ALL_OB_FT:[id number]==],[NS_DeliveryRN_OBGYN_ALL_OB_FT:[id number]=]

## 2023-12-19 NOTE — OB RN INTRAOPERATIVE NOTE - NS_SURGICALCHECK_OBGYN_ALL_OB
NUTRITION NOTE    Called pt on listed phone number re: referral from Dr Hien Long to discuss low carb diet r/t elevated blood glucose levels. No answer. Will attempt to reach pt at a later time.
Yes

## 2023-12-19 NOTE — OB PROVIDER LABOR PROGRESS NOTE - NS_SUBJECTIVE/OBJECTIVE_OBGYN_ALL_OB_FT
Discussed labor course with Dr. Castellanos and patient. Recommend proceeding with pCS for arrest of dilation in latent labor. Risks/benefits/alternatives discussed w/ pt at and support person at length. All questions/concerns addressed. All questions/concerns addressed. Pt expressed understanding
Pt laying in bed with epidural  pit 22mu  AROM clear   3/30.-3  IUPC placed
Pt seen at bedside. Comfortable w/ epidural, denies contractions. TAUS performed, cephalic. SVE 4/30/-3
Assuming care of pt for day team.
Pt laying in bed comfortable with epidural pit 22 mu  balloon deflated
Pt seen at bedside. Reece balloon placed 80cc/tension without difficulty
PT laying in bed comfortable with epidural and balloon 80 to tension in place  VE 1 around balloon
Pt seen at bedside. Comfortable w/ epidural. No complaints at this time

## 2023-12-19 NOTE — OB RN DELIVERY SUMMARY - NSSELHIDDEN_OBGYN_ALL_OB_FT
[NS_DeliveryAttending1_OBGYN_ALL_OB_FT:[id number]==],[NS_DeliveryRN_OBGYN_ALL_OB_FT:[id number]=] [NS_DeliveryAttending1_OBGYN_ALL_OB_FT:[id number]==],[NS_DeliveryRN_OBGYN_ALL_OB_FT:[id number]=]

## 2023-12-19 NOTE — OB PROVIDER LABOR PROGRESS NOTE - NS_OBIHICONTRACTIONPATTERNDETAILS_OBGYN_ALL_OB_FT
Irreg rare ctx
Rare ctx, will adjust TOCO
2-3 in 10 min
Ctx q4min
3 in 10
Rare ctx
Rare ctx
3-4 in 10 min
Irreg rare ctx, not adequate
ctx q5min, not adequate w/ IUPC in place

## 2023-12-19 NOTE — OB PROVIDER LABOR PROGRESS NOTE - ASSESSMENT
- Cat I FHT  - Pit at 8mu, cont as tolerated  - IUPC replaced, contractions inadequate  - Cont current management

## 2023-12-19 NOTE — OB PROVIDER LABOR PROGRESS NOTE - ASSESSMENT
- Cat I FHT  - S/p plummer  - Epidural  - Pit at 28mu, inadequate contractions  - Will give 1hr pitocin break  - D/w pt and support person at bedside.  - All questions/concerns addressed. Pt expressed understanding

## 2023-12-19 NOTE — OB RN DELIVERY SUMMARY - NS_SEPSISRSKCALC_OBGYN_ALL_OB_FT
EOS calculated successfully. EOS Risk Factor: 0.5/1000 live births ([facility] national incidence); GA=37w5d; Temp=98.2; ROM=17.433; GBS='Positive'; Antibiotics='No antibiotics or any antibiotics < 2 hrs prior to birth'   EOS calculated successfully. EOS Risk Factor: 0.5/1000 live births ([facility] national incidence); GA=37w5d; Temp=98.2; ROM=17.433; GBS='Positive'; Antibiotics='No antibiotics or any antibiotics < 2 hrs prior to birth'   EOS calculated successfully. EOS Risk Factor: 0.5/1000 live births ([facility] national incidence); GA=37w5d; Temp=98.8; ROM=17.433; GBS='Positive'; Antibiotics='No antibiotics or any antibiotics < 2 hrs prior to birth'   EOS calculated successfully. EOS Risk Factor: 0.5/1000 live births ([facility] national incidence); GA=37w5d; Temp=98.8; ROM=17.433; GBS='Positive'; Antibiotics='No antibiotics or any antibiotics < 2 hrs prior to birth'

## 2023-12-19 NOTE — OB PROVIDER DELIVERY SUMMARY - NSFAILEDINDDETAILS_OBGYN_A_OB
Oxytocin administered for at least 12-18 hours after membrane rupture, without achieving cervical change and regular contractions

## 2023-12-19 NOTE — OB RN INTRAOPERATIVE NOTE - NSSELHIDDEN_OBGYN_ALL_OB_FT
[NS_DeliveryAttending1_OBGYN_ALL_OB_FT:[id number]==],[NS_DeliveryRN_OBGYN_ALL_OB_FT:[id number]=] [NS_DeliveryAttending1_OBGYN_ALL_OB_FT:[id number]==],[NS_DeliveryRN_OBGYN_ALL_OB_FT:[id number]=] [NS_DeliveryAttending1_OBGYN_ALL_OB_FT:[id number]==],[NS_DeliveryRN_OBGYN_ALL_OB_FT:[id number]=],[NS_DeliveryAssist1_OBGYN_ALL_OB_FT:[id number]=],[NS_DeliveryAssist2_OBGYN_ALL_OB_FT:[id number]=] [NS_DeliveryAttending1_OBGYN_ALL_OB_FT:[id number]==],[NS_DeliveryRN_OBGYN_ALL_OB_FT:[id number]=],[NS_DeliveryAssist1_OBGYN_ALL_OB_FT:[id number]=],[NS_DeliveryAssist2_OBGYN_ALL_OB_FT:[id number]=]

## 2023-12-19 NOTE — OB RN DELIVERY SUMMARY - AMNIOTIC FLUID AMOUNT, LABOR
[FreeTextEntry1] : 2019\par \par Roxy Hoang MD\par [address]\[place] [address]\par \par Re:	Prema Johnson\par :	1946\par \par Dear Dr. Hoang:\par \par The following is a follow-up on our mutual patient, Prema Johnson.  He underwent angiography by my colleague, Dr. Martinez, which demonstrates 2 aneurysms.  Both are proximal on the internal carotid artery bilaterally.  The right-sided is associated with a fusiform dilatation of almost the entirety of the intracranial ICA.  After review, we feel he is a candidate for treatment with flow-diverting stents for both aneurysms in 2 sittings, first the right side, which will hopefully eliminate the fusiform aspect of the diseased artery as well, and then, 4-5 weeks later, followed by the left side.  Dr. Martinez and I met with the patient today and we are moving forward with scheduling of treatment of these aneurysms.  \par \par We will keep you posted regarding his progress and we both thank you for the confidence and courtesy of this referral.\par \par Sincerely,\par \par \par \par Dakotah Vital M.D., F.A.C.S.\par [facility]\par \par 
within normal limits

## 2023-12-19 NOTE — OB RN DELIVERY SUMMARY - NS_SKININTERRUPTA_OBGYN_ALL_OB
Refill per protocol.    apixaBAN (Eliquis) 5 MG Tab 60 tablet 1 11/19/2020     Last office visit: 1/7/2021  Future Appointment: None    Maternal request, with counseling and support

## 2023-12-19 NOTE — OB PROVIDER LABOR PROGRESS NOTE - ASSESSMENT
- Cat I FHT  - S/p plummer  - Epidural in place  - Pit at 28mu  - IUPC in place, ctx not adequate  - Cont pitocin as tolerated  - D/w Dr. Castellanos

## 2023-12-19 NOTE — OB PROVIDER LABOR PROGRESS NOTE - ASSESSMENT
- Cat I FHT  - S/p plummer  - Epidural  - Pit at 6mu after pitocin break  - External TOCO, IUPC not functioning, will replace IUPC w/ next exam  - Dr. Castellanos in house

## 2023-12-19 NOTE — OB RN DELIVERY SUMMARY - NS_FETALMONITOR_OBGYN_ALL_OB
External [place]/Internal [place]/External [facility] External [place]/Internal [place]/External [facility]

## 2023-12-19 NOTE — OB PROVIDER LABOR PROGRESS NOTE - NS_OBIHIFHRDETAILS_OBGYN_ALL_OB_FT
FHT reviewed. Baseline 125, mod variability, +accels, -decels
FHT reviewed. baseline 130, mod variability, +accels, -decels
FHT reviewed. Baseline 140, mod variability, +accels, -decels.
T CAT I
CAT I
FHT reviewed. Baseline 130, mod variability, +accels, -decels
FHT reviewed. Baseline 140, mod variability, +accels, -decels.
CAT I
FHT reviewed. Baseline 135, mod variability, +accels, one variable deceleration during exam
FHT reviewed. Baseline 135, mod variability, +accels, -decels

## 2023-12-19 NOTE — OB PROVIDER DELIVERY SUMMARY - NSPROVIDERDELIVERYNOTE_OBGYN_ALL_OB_FT
Low transverse  for failed induction of labor . Fetus in cephalic presentation. Fluid clear. No difficulty with delivery. Uterus closed in one layer with vicryl.  Fascia closed with vicryl. Subcutaneous closed in two layers with plain gut. Skin closed with insorb. . IFV 1000. UOP 65.

## 2023-12-19 NOTE — OB RN PREOPERATIVE CHECKLIST - NS_PREOPVTEPROPHY_OBGYN_ALL_OB
Patient is requesting a later procedure date  Right hand is still sore from other surgery  She would like to wait 2 weeks  Will a new office visit be required  Please advise  No

## 2023-12-19 NOTE — OB PROVIDER DELIVERY SUMMARY - NS_DELIVERYATTENDING1_OBGYN_ALL_OB_FT
Encounter Date: 3/27/2023       History     Chief Complaint   Patient presents with    Palpitations     Pt reports feeling palpitations and chest pain intermittently since yesterday. States that the chest pain was in the epigastric region and it felt like pressure. Reports Htn and takes medication for it      63-year-old female, here from home via private vehicle complaining of palpitations and generalized chest discomfort since yesterday.  She states she feels pressure in her chest.  No radiation.  She has mild shortness of breath.  She admits to feeling anxious as well.  In triage, blood pressure is elevated at 160 1/90, and heart rate is elevated at 129 beats per minute.  O2 saturations are normal at 99% on room air.  Respiratory rate normal.  Patient states she just does not feel like her normal self.    Review of patient's allergies indicates:  No Known Allergies  Past Medical History:   Diagnosis Date    Diabetes mellitus     Hypertension     Thyroid disease      Past Surgical History:   Procedure Laterality Date     SECTION       History reviewed. No pertinent family history.  Social History     Tobacco Use    Smoking status: Every Day     Packs/day: 0.50     Types: Cigarettes    Smokeless tobacco: Never   Substance Use Topics    Alcohol use: No    Drug use: No     Review of Systems   Constitutional: Negative.    HENT: Negative.     Eyes: Negative.    Respiratory: Negative.     Cardiovascular:  Positive for chest pain (Chest pressure) and palpitations.   Gastrointestinal: Negative.    Endocrine: Negative.    Genitourinary: Negative.    Musculoskeletal: Negative.    Skin: Negative.    Allergic/Immunologic: Negative.    Neurological: Negative.    Hematological: Negative.    Psychiatric/Behavioral: Negative.       Physical Exam     Initial Vitals [23 1214]   BP Pulse Resp Temp SpO2   (!) 161/90 (!) 129 20 97.5 °F (36.4 °C) 99 %      MAP       --         Physical Exam    Nursing note and vitals  reviewed.  Constitutional: She appears well-developed and well-nourished. She is not diaphoretic. No distress.   HENT:   Head: Normocephalic and atraumatic.   Nose: Nose normal.   Mouth/Throat: Oropharynx is clear and moist. No oropharyngeal exudate.   Eyes: Conjunctivae and EOM are normal. Pupils are equal, round, and reactive to light. No scleral icterus.   Neck: Neck supple. No JVD present.   Normal range of motion.  Cardiovascular:  Normal rate, regular rhythm, normal heart sounds and intact distal pulses.           No murmur heard.  Pulmonary/Chest: Breath sounds normal. No stridor. No respiratory distress. She has no wheezes. She has no rhonchi. She has no rales.   Abdominal: Abdomen is soft. Bowel sounds are normal. She exhibits no distension. There is no abdominal tenderness. There is no rebound and no guarding.   Musculoskeletal:         General: No tenderness or edema. Normal range of motion.      Cervical back: Normal range of motion and neck supple.     Neurological: She is alert and oriented to person, place, and time. She has normal strength. No cranial nerve deficit or sensory deficit. GCS score is 15. GCS eye subscore is 4. GCS verbal subscore is 5. GCS motor subscore is 6.   Skin: Skin is warm and dry. Capillary refill takes less than 2 seconds. No rash noted. No erythema.   Psychiatric: She has a normal mood and affect. Her behavior is normal.       ED Course   Procedures  Labs Reviewed   CBC W/ AUTO DIFFERENTIAL - Abnormal; Notable for the following components:       Result Value    Gran % 34.5 (*)     Lymph % 57.1 (*)     All other components within normal limits   COMPREHENSIVE METABOLIC PANEL - Abnormal; Notable for the following components:    CO2 21 (*)     Glucose 144 (*)     BUN 28 (*)     Creatinine 2.0 (*)     eGFR 27.4 (*)     All other components within normal limits   B-TYPE NATRIURETIC PEPTIDE   TROPONIN I   PROTIME-INR   TSH   TSH   HIV 1 / 2 ANTIBODY   HEPATITIS C ANTIBODY     EKG  Readings: (Independently Interpreted)   EKG personally reviewed by me shows sinus tachycardia at 127 beats per minute.  Left atrial enlargement.  AL interval 144, .  No ST elevations or depressions, no T-wave changes.     Imaging Results              X-Ray Chest AP Portable (Final result)  Result time 03/27/23 13:00:32      Final result by Roberto Lino MD (03/27/23 13:00:32)                   Impression:      Negative chest.      Electronically signed by: Roberto Lino MD  Date:    03/27/2023  Time:    13:00               Narrative:    EXAMINATION:  XR CHEST AP PORTABLE    CLINICAL HISTORY:  Chest pain, unspecified    TECHNIQUE:  Single frontal view of the chest was performed.    COMPARISON:  06/21/2021    FINDINGS:  The cardiomediastinal silhouette is within normal limits.  The lungs are well expanded without consolidation or pleural effusion.                                    X-Rays:   Independently Interpreted Readings:   Other Readings:  Chest x-ray personally reviewed by me shows clear lungs bilaterally.  Normal cardiac silhouette, normal skeletal structures.  Medications   aspirin chewable tablet 324 mg (324 mg Oral Given 3/27/23 1232)   metoprolol injection 5 mg (5 mg Intravenous Given 3/27/23 1313)     Medical Decision Making:   Differential Diagnosis:   Anxiety, myocardial ischemia, myocardial infarction, thyroid disease, COPD, CHF, asthma, etc.  ED Management:  Patient's labs are remarkable for an elevated BUN and creatinine, but she appears to be stable.  Labs from 1 year ago were almost identical.  Patient was given Lopressor 5 mg for elevated blood pressure and elevated pulse rate.  Both have improved significantly and patient states she is feeling more like her normal self.  Troponin was normal.  EKG did not show any evidence of STEMI.  Chest x-ray was clear.  Unsure the exact etiology of her symptoms, but does not believe that she is having cardiac event at this time.  Anxiety may  be contributory.                        Clinical Impression:   Final diagnoses:  [R07.9] Chest pain  [R00.2] Palpitations (Primary)  [I10] Hypertension, unspecified type        ED Disposition Condition    Discharge Stable          ED Prescriptions       Medication Sig Dispense Start Date End Date Auth. Provider    metoprolol succinate (TOPROL-XL) 25 MG 24 hr tablet Take 1 tablet (25 mg total) by mouth once daily. 30 tablet 3/27/2023 3/26/2024 Artie Orona MD          Follow-up Information       Follow up With Specialties Details Why Contact Info    Mary Metz NP Internal Medicine  Friday, as scheduled [address]  [facility] [address] [phone]      [facility] Emergency Dept Emergency Medicine  If symptoms worsen [address] [address] [phone]             Artie Orona MD  03/27/23 3321     Juan Manuel Mcgarry MD

## 2023-12-20 LAB
HCT VFR BLD CALC: 34.5 % — SIGNIFICANT CHANGE UP (ref 34.5–45)
HCT VFR BLD CALC: 34.5 % — SIGNIFICANT CHANGE UP (ref 34.5–45)
HGB BLD-MCNC: 11.6 G/DL — SIGNIFICANT CHANGE UP (ref 11.5–15.5)
HGB BLD-MCNC: 11.6 G/DL — SIGNIFICANT CHANGE UP (ref 11.5–15.5)
MCHC RBC-ENTMCNC: 33 PG — SIGNIFICANT CHANGE UP (ref 27–34)
MCHC RBC-ENTMCNC: 33 PG — SIGNIFICANT CHANGE UP (ref 27–34)
MCHC RBC-ENTMCNC: 33.6 GM/DL — SIGNIFICANT CHANGE UP (ref 32–36)
MCHC RBC-ENTMCNC: 33.6 GM/DL — SIGNIFICANT CHANGE UP (ref 32–36)
MCV RBC AUTO: 98.3 FL — SIGNIFICANT CHANGE UP (ref 80–100)
MCV RBC AUTO: 98.3 FL — SIGNIFICANT CHANGE UP (ref 80–100)
NRBC # BLD: 0 /100 WBCS — SIGNIFICANT CHANGE UP (ref 0–0)
NRBC # BLD: 0 /100 WBCS — SIGNIFICANT CHANGE UP (ref 0–0)
PLATELET # BLD AUTO: 197 K/UL — SIGNIFICANT CHANGE UP (ref 150–400)
PLATELET # BLD AUTO: 197 K/UL — SIGNIFICANT CHANGE UP (ref 150–400)
RBC # BLD: 3.51 M/UL — LOW (ref 3.8–5.2)
RBC # BLD: 3.51 M/UL — LOW (ref 3.8–5.2)
RBC # FLD: 13.7 % — SIGNIFICANT CHANGE UP (ref 10.3–14.5)
RBC # FLD: 13.7 % — SIGNIFICANT CHANGE UP (ref 10.3–14.5)
WBC # BLD: 15.87 K/UL — HIGH (ref 3.8–10.5)
WBC # BLD: 15.87 K/UL — HIGH (ref 3.8–10.5)
WBC # FLD AUTO: 15.87 K/UL — HIGH (ref 3.8–10.5)
WBC # FLD AUTO: 15.87 K/UL — HIGH (ref 3.8–10.5)

## 2023-12-20 PROCEDURE — 99232 SBSQ HOSP IP/OBS MODERATE 35: CPT

## 2023-12-20 RX ORDER — OXYCODONE HYDROCHLORIDE 5 MG/1
5 TABLET ORAL
Refills: 0 | Status: DISCONTINUED | OUTPATIENT
Start: 2023-12-20 | End: 2023-12-22

## 2023-12-20 RX ORDER — DIPHENHYDRAMINE HCL 50 MG
25 CAPSULE ORAL ONCE
Refills: 0 | Status: COMPLETED | OUTPATIENT
Start: 2023-12-20 | End: 2023-12-20

## 2023-12-20 RX ADMIN — Medication 30 MILLIGRAM(S): at 01:52

## 2023-12-20 RX ADMIN — ENOXAPARIN SODIUM 40 MILLIGRAM(S): 100 INJECTION SUBCUTANEOUS at 10:05

## 2023-12-20 RX ADMIN — Medication 975 MILLIGRAM(S): at 05:21

## 2023-12-20 RX ADMIN — Medication 975 MILLIGRAM(S): at 16:55

## 2023-12-20 RX ADMIN — Medication 30 MILLIGRAM(S): at 07:51

## 2023-12-20 RX ADMIN — Medication 25 MILLIGRAM(S): at 06:32

## 2023-12-20 RX ADMIN — OXYCODONE HYDROCHLORIDE 5 MILLIGRAM(S): 5 TABLET ORAL at 13:05

## 2023-12-20 RX ADMIN — Medication 30 MILLIGRAM(S): at 20:48

## 2023-12-20 RX ADMIN — Medication 25 MILLIGRAM(S): at 06:36

## 2023-12-20 RX ADMIN — Medication 975 MILLIGRAM(S): at 11:42

## 2023-12-20 RX ADMIN — Medication 975 MILLIGRAM(S): at 23:33

## 2023-12-20 RX ADMIN — Medication 30 MILLIGRAM(S): at 14:25

## 2023-12-20 NOTE — PROGRESS NOTE ADULT - SUBJECTIVE AND OBJECTIVE BOX
Patient evaluated at bedside this morning, resting comfortable in bed, with no acute events overnight.  She reports pain is well controlled with oral pain medications.   She denies headache, dizziness, chest pain, palpitations, shortness of breath, nausea, vomiting or heavy vaginal bleeding.  She has not tried ambulating since procedure, plummer remains in place at this time. Tolerating clear liquids.     Physical Exam:  Vital Signs Last 24 Hrs  T(C): 36.8 (19 Dec 2023 22:15), Max: 37.1 (19 Dec 2023 20:19)  T(F): 98.2 (19 Dec 2023 22:15), Max: 98.8 (19 Dec 2023 20:19)  HR: 106 (20 Dec 2023 01:20) (100 - 112)  BP: 111/65 (20 Dec 2023 01:20) (99/53 - 122/60)  BP(mean): --  RR: 16 (20 Dec 2023 01:20) (16 - 17)  SpO2: 95% (20 Dec 2023 01:20) (95% - 99%)    Parameters below as of 20 Dec 2023 00:35  Patient On (Oxygen Delivery Method): room air        GA: NAD, A+0 x 3  Pulm: no increased WOB  Abd: soft, nontender, nondistended, no rebound or guarding, incision clean, dry and intact, uterus firm at midline, 2 fb below umbilicus  : plummer in situ, lochia WNL  Extremities: no swelling or calf tenderness, SCDs in place                            12.3   11.00 )-----------( 198      ( 18 Dec 2023 11:00 )             35.6     12-18    133<L>  |  104  |  5<L>  ----------------------------<  94  3.8   |  19<L>  |  0.55    Ca    9.1      18 Dec 2023 11:00    TPro  6.5  /  Alb  3.5  /  TBili  0.8  /  DBili  x   /  AST  22  /  ALT  12  /  AlkPhos  140<H>  12-18        acetaminophen     Tablet .. 975 milliGRAM(s) Oral <User Schedule>  azithromycin  IVPB 500 milliGRAM(s) IV Intermittent once  chlorhexidine 2% Cloths 1 Application(s) Topical daily  diphenhydrAMINE 25 milliGRAM(s) Oral every 6 hours PRN  diphtheria/tetanus/pertussis (acellular) Vaccine (Adacel) 0.5 milliLiter(s) IntraMuscular once  enoxaparin Injectable 40 milliGRAM(s) SubCutaneous every 24 hours  fentanyl (2 MICROgram(s)/mL) + bupivacaine 0.0625%  in 0.9% Sodium Chloride PCEA 250 milliLiter(s) Epidural PCA Continuous  ibuprofen  Tablet. 600 milliGRAM(s) Oral every 6 hours  ketorolac   Injectable 30 milliGRAM(s) IV Push every 6 hours  lactated ringers. 1000 milliLiter(s) IV Continuous <Continuous>  lanolin Ointment 1 Application(s) Topical every 6 hours PRN  magnesium hydroxide Suspension 30 milliLiter(s) Oral two times a day PRN  metoprolol succinate ER 25 milliGRAM(s) Oral every 24 hours  ondansetron Injectable 8 milliGRAM(s) IV Push every 8 hours PRN  oxyCODONE    IR 5 milliGRAM(s) Oral once PRN  oxyCODONE    IR 5 milliGRAM(s) Oral every 3 hours PRN  oxytocin Infusion 333.333 milliUNIT(s)/Min IV Continuous <Continuous>  simethicone 80 milliGRAM(s) Chew every 4 hours PRN

## 2023-12-20 NOTE — LACTATION INITIAL EVALUATION - LACTATION INTERVENTIONS
initiate/review pumping guidelines and safe milk handling/post discharge community resources provided/initiate/review supplementation plan due to medical indications/review techniques to increase milk supply/initiate/review breast massage/compression

## 2023-12-21 ENCOUNTER — APPOINTMENT (OUTPATIENT)
Dept: HEART AND VASCULAR | Facility: CLINIC | Age: 38
End: 2023-12-21

## 2023-12-21 ENCOUNTER — TRANSCRIPTION ENCOUNTER (OUTPATIENT)
Age: 38
End: 2023-12-21

## 2023-12-21 PROCEDURE — 99232 SBSQ HOSP IP/OBS MODERATE 35: CPT

## 2023-12-21 RX ORDER — METOPROLOL TARTRATE 50 MG
1 TABLET ORAL
Qty: 30 | Refills: 0
Start: 2023-12-21 | End: 2024-01-19

## 2023-12-21 RX ORDER — IBUPROFEN 200 MG
1 TABLET ORAL
Qty: 0 | Refills: 0 | DISCHARGE
Start: 2023-12-21

## 2023-12-21 RX ORDER — IBUPROFEN 200 MG
600 TABLET ORAL EVERY 6 HOURS
Refills: 0 | Status: DISCONTINUED | OUTPATIENT
Start: 2023-12-21 | End: 2023-12-22

## 2023-12-21 RX ORDER — ACETAMINOPHEN 500 MG
3 TABLET ORAL
Qty: 0 | Refills: 0 | DISCHARGE
Start: 2023-12-21

## 2023-12-21 RX ADMIN — Medication 975 MILLIGRAM(S): at 12:39

## 2023-12-21 RX ADMIN — ENOXAPARIN SODIUM 40 MILLIGRAM(S): 100 INJECTION SUBCUTANEOUS at 09:44

## 2023-12-21 RX ADMIN — Medication 975 MILLIGRAM(S): at 06:55

## 2023-12-21 RX ADMIN — Medication 975 MILLIGRAM(S): at 11:46

## 2023-12-21 RX ADMIN — Medication 975 MILLIGRAM(S): at 17:58

## 2023-12-21 RX ADMIN — Medication 975 MILLIGRAM(S): at 18:32

## 2023-12-21 RX ADMIN — Medication 600 MILLIGRAM(S): at 15:43

## 2023-12-21 RX ADMIN — Medication 25 MILLIGRAM(S): at 06:59

## 2023-12-21 RX ADMIN — Medication 600 MILLIGRAM(S): at 15:02

## 2023-12-21 NOTE — PROGRESS NOTE ADULT - SUBJECTIVE AND OBJECTIVE BOX
VASCULAR CARDIOLOGY ATTENDING PROGRESS NOTE    SERVICE LINE: [phone]    Subjective:    No acute events overnight.   ROS negative.     Current Medications:   acetaminophen     Tablet .. 975 milliGRAM(s) Oral <User Schedule>  azithromycin  IVPB 500 milliGRAM(s) IV Intermittent once  chlorhexidine 2% Cloths 1 Application(s) Topical daily  diphenhydrAMINE 25 milliGRAM(s) Oral every 6 hours PRN  diphtheria/tetanus/pertussis (acellular) Vaccine (Adacel) 0.5 milliLiter(s) IntraMuscular once  enoxaparin Injectable 40 milliGRAM(s) SubCutaneous every 24 hours  fentanyl (2 MICROgram(s)/mL) + bupivacaine 0.0625%  in 0.9% Sodium Chloride PCEA 250 milliLiter(s) Epidural PCA Continuous  ibuprofen  Tablet. 600 milliGRAM(s) Oral every 6 hours  ketorolac   Injectable 30 milliGRAM(s) IV Push every 6 hours  lactated ringers. 1000 milliLiter(s) IV Continuous <Continuous>  lanolin Ointment 1 Application(s) Topical every 6 hours PRN  magnesium hydroxide Suspension 30 milliLiter(s) Oral two times a day PRN  metoprolol succinate ER 25 milliGRAM(s) Oral every 24 hours  ondansetron Injectable 8 milliGRAM(s) IV Push every 8 hours PRN  oxyCODONE    IR 5 milliGRAM(s) Oral once PRN  oxyCODONE    IR 5 milliGRAM(s) Oral every 3 hours PRN  oxytocin Infusion 333.333 milliUNIT(s)/Min IV Continuous <Continuous>  simethicone 80 milliGRAM(s) Chew every 4 hours PRN      REVIEW OF SYSTEMS:  CONSTITUTIONAL: No weakness, fevers or chills  EYES/ENT: No visual changes;  No dysphagia  NECK: No pain or stiffness  RESPIRATORY: No cough, wheezing, hemoptysis; No shortness of breath  CARDIOVASCULAR: No chest pain or palpitations; No lower extremity edema  GASTROINTESTINAL: No abdominal or epigastric pain. No nausea, vomiting, or hematemesis; No diarrhea or constipation. No melena or hematochezia.  BACK: No back pain  GENITOURINARY: No dysuria, frequency or hematuria  NEUROLOGICAL: No numbness or weakness  SKIN: No itching, burning, rashes, or lesions   All other review of systems is negative unless indicated above.    Physical Exam:  T(F): 97.5 (12-20), Max: 98.8 (12-19)  HR: 76 (12-20) (76 - 112)  BP: 107/67 (12-20) (99/53 - 122/60)  BP(mean): --  ABP: --  ABP(mean): --  RR: 18 (12-20)  SpO2: 100% (12-20)  GENERAL: No acute distress, well-developed  HEAD:  Atraumatic, Normocephalic  ENT: EOMI, PERRLA, conjunctiva and sclera clear, Neck supple, No JVD, moist mucosa  CHEST/LUNG: Clear to auscultation bilaterally; No wheeze, equal breath sounds bilaterally   BACK: No spinal tenderness  HEART: Regular rate and rhythm; No murmurs, rubs, or gallops  ABDOMEN: Soft, Nontender, Nondistended; Bowel sounds present  EXTREMITIES:  No clubbing, cyanosis, or edema  PSYCH: Nl behavior, nl affect  NEUROLOGY: AAOx3, non-focal, cranial nerves intact  SKIN: Normal color, No rashes or lesions  LINES:    Cardiovascular Diagnostic Testing: personally reviewed    CXR: Personally reviewed    Labs: Personally reviewed                        11.6   15.87 )-----------( 197      ( 20 Dec 2023 12:00 )             34.5                              VASCULAR CARDIOLOGY ATTENDING PROGRESS NOTE    SERVICE LINE: [phone]    Subjective:    No acute events overnight.   ROS negative.     Current Medications:   acetaminophen     Tablet .. 975 milliGRAM(s) Oral <User Schedule>  azithromycin  IVPB 500 milliGRAM(s) IV Intermittent once  chlorhexidine 2% Cloths 1 Application(s) Topical daily  diphenhydrAMINE 25 milliGRAM(s) Oral every 6 hours PRN  diphtheria/tetanus/pertussis (acellular) Vaccine (Adacel) 0.5 milliLiter(s) IntraMuscular once  enoxaparin Injectable 40 milliGRAM(s) SubCutaneous every 24 hours  fentanyl (2 MICROgram(s)/mL) + bupivacaine 0.0625%  in 0.9% Sodium Chloride PCEA 250 milliLiter(s) Epidural PCA Continuous  ibuprofen  Tablet. 600 milliGRAM(s) Oral every 6 hours  ketorolac   Injectable 30 milliGRAM(s) IV Push every 6 hours  lactated ringers. 1000 milliLiter(s) IV Continuous <Continuous>  lanolin Ointment 1 Application(s) Topical every 6 hours PRN  magnesium hydroxide Suspension 30 milliLiter(s) Oral two times a day PRN  metoprolol succinate ER 25 milliGRAM(s) Oral every 24 hours  ondansetron Injectable 8 milliGRAM(s) IV Push every 8 hours PRN  oxyCODONE    IR 5 milliGRAM(s) Oral once PRN  oxyCODONE    IR 5 milliGRAM(s) Oral every 3 hours PRN  oxytocin Infusion 333.333 milliUNIT(s)/Min IV Continuous <Continuous>  simethicone 80 milliGRAM(s) Chew every 4 hours PRN      REVIEW OF SYSTEMS:  CONSTITUTIONAL: No weakness, fevers or chills  EYES/ENT: No visual changes;  No dysphagia  NECK: No pain or stiffness  RESPIRATORY: No cough, wheezing, hemoptysis; No shortness of breath  CARDIOVASCULAR: No chest pain or palpitations; No lower extremity edema  GASTROINTESTINAL: No abdominal or epigastric pain. No nausea, vomiting, or hematemesis; No diarrhea or constipation. No melena or hematochezia.  BACK: No back pain  GENITOURINARY: No dysuria, frequency or hematuria  NEUROLOGICAL: No numbness or weakness  SKIN: No itching, burning, rashes, or lesions   All other review of systems is negative unless indicated above.    Physical Exam:  T(F): 97.5 (12-20), Max: 98.8 (12-19)  HR: 76 (12-20) (76 - 112)  BP: 107/67 (12-20) (99/53 - 122/60)  BP(mean): --  ABP: --  ABP(mean): --  RR: 18 (12-20)  SpO2: 100% (12-20)  GENERAL: No acute distress, well-developed  HEAD:  Atraumatic, Normocephalic  ENT: EOMI, PERRLA, conjunctiva and sclera clear, Neck supple, No JVD, moist mucosa  CHEST/LUNG: Clear to auscultation bilaterally; No wheeze, equal breath sounds bilaterally   BACK: No spinal tenderness  HEART: Regular rate and rhythm; No murmurs, rubs, or gallops  ABDOMEN: Soft, Nontender, Nondistended; Bowel sounds present  EXTREMITIES:  No clubbing, cyanosis, or edema  PSYCH: Nl behavior, nl affect  NEUROLOGY: AAOx3, non-focal, cranial nerves intact  SKIN: Normal color, No rashes or lesions  LINES:    Cardiovascular Diagnostic Testing: personally reviewed    CXR: Personally reviewed    Labs: Personally reviewed                        11.6   15.87 )-----------( 197      ( 20 Dec 2023 12:00 )             34.5

## 2023-12-21 NOTE — DISCHARGE NOTE OB - PROVIDER TOKENS
FREE:[LAST:[[facility]],PHONE:[phone]],FAX:[(   )    -],ADDRESS:[[address] , [place], [place]]] FREE:[LAST:[[facility]],PHONE:[phone]],FAX:[(   )    -],ADDRESS:[[address] , [place], [place]]]

## 2023-12-21 NOTE — DISCHARGE NOTE OB - CARE PLAN
1 Principal Discharge DX:	Pregnancy, delivered  Assessment and plan of treatment:	- Take Motrin 600mg every 6 hours and/or tylenol 650mg every 6 hours as needed for pain.    - Call your OBGYN to schedule a follow up appointment in 1-2weeks.   - Keep incision site clean and dry.   - Call your OBGYN if you experience severe abdominal pain not improved by oral pain medications, heavy bright red vaginal bleeding saturating more than 1 pad per hour, redness/warmth at incision site, drainage from incision site, or fever greater than 100.4F.  Secondary Diagnosis:	Sinus tachycardia  Assessment and plan of treatment:	Please continue taking Toprol 25mg once per day. Please follow up with Dr. Dimas (cardiologist) as an outpatient in 1-2 weeks. Please call [phone] to make an appointment.  Secondary Diagnosis:	Gestational hypertension  Assessment and plan of treatment:	- Follow up with your OB in one week for a blood pressure check (call to confirm appointment).  - Purchase electronic blood pressure cuff at your local pharmacy.   - Check blood pressure 3x a day and write it down (bring to your doctor's appointment).   - If BP >150/100 please call your doctor.   - If BP >160/110, you develop a headache not relieved by tylenol, visual disturbances, or have right upper abdominal pain, call your doctor or the hospital, or go to your nearest emergency room.   - Regular diet, normal activity, nothing in the vagina for 6 weeks--no sex, tampons, tub baths, or swimming pools.   Principal Discharge DX:	Pregnancy, delivered  Assessment and plan of treatment:	- Take Motrin 600mg every 6 hours and/or tylenol 650mg every 6 hours as needed for pain.    - Call your OBGYN to schedule a follow up appointment in 1-2weeks.   - Keep incision site clean and dry.   - Call your OBGYN if you experience severe abdominal pain not improved by oral pain medications, heavy bright red vaginal bleeding saturating more than 1 pad per hour, redness/warmth at incision site, drainage from incision site, or fever greater than 100.4F.  Secondary Diagnosis:	Sinus tachycardia  Assessment and plan of treatment:	Please continue taking Toprol 25mg once per day. Please follow up with Dr. Dimas (cardiologist) as an outpatient in 1-2 weeks. Please call [phone] to make an appointment.  Secondary Diagnosis:	Gestational hypertension  Assessment and plan of treatment:	- Follow up with your OB in one week for a blood pressure check (call to confirm appointment).  - Purchase electronic blood pressure cuff at your local pharmacy.   - Check blood pressure 3x a day and write it down (bring to your doctor's appointment).   - If BP >150/100 please call your doctor.   - If BP >160/110, you develop a headache not relieved by tylenol, visual disturbances, or have right upper abdominal pain, call your doctor or the hospital, or go to your nearest emergency room.   - Regular diet, normal activity, nothing in the vagina for 6 weeks--no sex, tampons, tub baths, or swimming pools.

## 2023-12-21 NOTE — PROGRESS NOTE ADULT - SUBJECTIVE AND OBJECTIVE BOX
Patient evaluated at bedside this morning, resting comfortable in bed.   She reports pain is well controlled with oral pain medications.   She denies headache, dizziness, chest pain, palpitations, shortness of breath, nausea, vomiting or heavy vaginal bleeding.  She has been ambulating without assistance, voiding spontaneously, passing gas, tolerating regular diet.     Physical Exam:  Vital Signs Last 24 Hrs  T(C): 36.5 (21 Dec 2023 06:00), Max: 36.6 (20 Dec 2023 14:00)  T(F): 97.7 (21 Dec 2023 06:00), Max: 97.8 (20 Dec 2023 14:00)  HR: 91 (21 Dec 2023 06:00) (64 - 91)  BP: 124/82 (21 Dec 2023 06:00) (107/67 - 124/82)  BP(mean): --  RR: 18 (21 Dec 2023 06:00) (16 - 18)  SpO2: 100% (21 Dec 2023 06:00) (99% - 100%)    Parameters below as of 21 Dec 2023 06:00  Patient On (Oxygen Delivery Method): room air        GA: NAD, A+0 x 3  Pulm: no increased WOB  Abd: soft, nontender, nondistended, no rebound or guarding, incision clean, dry and intact, uterus firm at midline, 2 fb below umbilicus  Extremities: no swelling or calf tenderness                             11.6   15.87 )-----------( 197      ( 20 Dec 2023 12:00 )             34.5

## 2023-12-21 NOTE — DISCHARGE NOTE OB - HOSPITAL COURSE
37yo  s/p pCS for arrest of dilation after IOL for gestational HTN and FGR. She has a history of sinus tachycardia followed by cardiology with normal TTE 11/15/2023 (one day ante admission). Cardiology saw her while inpatient and started her on Toprol 25mg qd and will follow up with Dr. Dimas as outpatient.  Patient’s postoperative course was otherwise unremarkable and she remained hemodynamically stable and afebrile throughout. Upon discharge the patient is ambulating without assistance, voiding spontaneously, tolerating oral intake. Pain is well controlled with oral medication and vital signs are stable. She is normotensive not on antihypertensive medication.  39yo  s/p pCS for arrest of dilation after IOL for gestational HTN and FGR. She has a history of sinus tachycardia followed by cardiology with normal TTE 11/15/2023 (one day ante admission). Cardiology saw her while inpatient and started her on Toprol 25mg qd and will follow up with Dr. Dimas as outpatient.  Patient’s postoperative course was otherwise unremarkable and she remained hemodynamically stable and afebrile throughout. Upon discharge the patient is ambulating without assistance, voiding spontaneously, tolerating oral intake. Pain is well controlled with oral medication and vital signs are stable. She is normotensive not on antihypertensive medication.

## 2023-12-21 NOTE — DISCHARGE NOTE OB - MEDICATION SUMMARY - MEDICATIONS TO TAKE
I will START or STAY ON the medications listed below when I get home from the hospital:    ibuprofen 600 mg oral tablet  -- 1 tab(s) by mouth every 6 hours  -- Indication: For Pain    acetaminophen 325 mg oral tablet  -- 3 tab(s) by mouth every 6 hours  -- Indication: For Pain    Toprol-XL 25 mg oral tablet, extended release  -- 1 tab(s) by mouth once a day  -- Indication: For tachycardia    Prenatal Multivitamins with Folic Acid 1 mg oral tablet  -- 1 tab(s) by mouth once a day  -- Indication: For breastfeeding

## 2023-12-21 NOTE — DISCHARGE NOTE OB - PLAN OF CARE
- Follow up with your OB in one week for a blood pressure check (call to confirm appointment).  - Purchase electronic blood pressure cuff at your local pharmacy.   - Check blood pressure 3x a day and write it down (bring to your doctor's appointment).   - If BP >150/100 please call your doctor.   - If BP >160/110, you develop a headache not relieved by tylenol, visual disturbances, or have right upper abdominal pain, call your doctor or the hospital, or go to your nearest emergency room.   - Regular diet, normal activity, nothing in the vagina for 6 weeks--no sex, tampons, tub baths, or swimming pools. Please continue taking Toprol 25mg once per day. Please follow up with Dr. Dimas (cardiologist) as an outpatient in 1-2 weeks. Please call [phone] to make an appointment. Please continue taking Toprol 25mg once per day. Please follow up with Dr. Dimas (cardiologist) as an outpatient in 1-2 weeks. Please call [phone] to make an appointment. - Take Motrin 600mg every 6 hours and/or tylenol 650mg every 6 hours as needed for pain.    - Call your OBGYN to schedule a follow up appointment in 1-2weeks.   - Keep incision site clean and dry.   - Call your OBGYN if you experience severe abdominal pain not improved by oral pain medications, heavy bright red vaginal bleeding saturating more than 1 pad per hour, redness/warmth at incision site, drainage from incision site, or fever greater than 100.4F.

## 2023-12-21 NOTE — DISCHARGE NOTE OB - PATIENT PORTAL LINK FT
You can access the FollowMyHealth Patient Portal offered by [facility] by registering at the following website: [email or web address]. By joining [employer]’s FollowMyHealth portal, you will also be able to view your health information using other applications (apps) compatible with our system. You can access the FollowMyHealth Patient Portal offered by [facility] by registering at the following website: [email or web address]. By joining [employer]’s FollowMyHealth portal, you will also be able to view your health information using other applications (apps) compatible with our system.

## 2023-12-21 NOTE — DISCHARGE NOTE OB - NS MD DC FALL RISK RISK
For information on Fall & Injury Prevention, visit: [email or web address] OR  [email or web address] OR  https://www.cdc.gov/steadi/patient.html For information on Fall & Injury Prevention, visit: [email or web address] OR  [email or web address] OR  https://www.cdc.gov/steadi/patient.html

## 2023-12-21 NOTE — DISCHARGE NOTE OB - CARE PROVIDER_API CALL
[facility],   [address] , [place], [place]  Phone: [phone]  Fax: (   )    -  Follow Up Time:    [facility],   [address] , [place], [place]  Phone: [phone]  Fax: (   )    -  Follow Up Time:

## 2023-12-22 VITALS — DIASTOLIC BLOOD PRESSURE: 79 MMHG | HEART RATE: 77 BPM | SYSTOLIC BLOOD PRESSURE: 122 MMHG

## 2023-12-22 PROCEDURE — 83615 LACTATE (LD) (LDH) ENZYME: CPT

## 2023-12-22 PROCEDURE — 86850 RBC ANTIBODY SCREEN: CPT

## 2023-12-22 PROCEDURE — 85384 FIBRINOGEN ACTIVITY: CPT

## 2023-12-22 PROCEDURE — 36415 COLL VENOUS BLD VENIPUNCTURE: CPT

## 2023-12-22 PROCEDURE — 80053 COMPREHEN METABOLIC PANEL: CPT

## 2023-12-22 PROCEDURE — 83880 ASSAY OF NATRIURETIC PEPTIDE: CPT

## 2023-12-22 PROCEDURE — 93005 ELECTROCARDIOGRAM TRACING: CPT

## 2023-12-22 PROCEDURE — 86780 TREPONEMA PALLIDUM: CPT

## 2023-12-22 PROCEDURE — 84550 ASSAY OF BLOOD/URIC ACID: CPT

## 2023-12-22 PROCEDURE — 59050 FETAL MONITOR W/REPORT: CPT

## 2023-12-22 PROCEDURE — 86900 BLOOD TYPING SEROLOGIC ABO: CPT

## 2023-12-22 PROCEDURE — 85027 COMPLETE CBC AUTOMATED: CPT

## 2023-12-22 PROCEDURE — 88307 TISSUE EXAM BY PATHOLOGIST: CPT

## 2023-12-22 PROCEDURE — 82570 ASSAY OF URINE CREATININE: CPT

## 2023-12-22 PROCEDURE — 86901 BLOOD TYPING SEROLOGIC RH(D): CPT

## 2023-12-22 PROCEDURE — 85025 COMPLETE CBC W/AUTO DIFF WBC: CPT

## 2023-12-22 PROCEDURE — 84156 ASSAY OF PROTEIN URINE: CPT

## 2023-12-22 RX ADMIN — Medication 975 MILLIGRAM(S): at 06:28

## 2023-12-22 RX ADMIN — Medication 25 MILLIGRAM(S): at 07:28

## 2023-12-22 RX ADMIN — Medication 975 MILLIGRAM(S): at 00:11

## 2023-12-22 NOTE — PROGRESS NOTE ADULT - SUBJECTIVE AND OBJECTIVE BOX
Patient evaluated at bedside this morning, resting comfortable in bed.   She reports pain is well controlled with oral pain medications.   She denies headache, dizziness, chest pain, palpitations, shortness of breath, nausea, vomiting or heavy vaginal bleeding.  She has been ambulating without assistance, voiding spontaneously, passing gas, tolerating regular diet.     Physical Exam:  Vital Signs Last 24 Hrs  T(C): 36.9 (22 Dec 2023 02:04), Max: 37 (21 Dec 2023 10:12)  T(F): 98.5 (22 Dec 2023 02:04), Max: 98.6 (21 Dec 2023 10:12)  HR: 91 (22 Dec 2023 02:04) (79 - 99)  BP: 119/81 (22 Dec 2023 02:04) (110/72 - 128/83)  BP(mean): --  RR: 18 (22 Dec 2023 02:04) (18 - 18)  SpO2: 99% (22 Dec 2023 02:04) (97% - 100%)    Parameters below as of 22 Dec 2023 02:04  Patient On (Oxygen Delivery Method): room air        GA: NAD, A+0 x 3  Pulm: no increased WOB  Abd: soft, nontender, nondistended, no rebound or guarding, incision clean, dry and intact, uterus firm at midline, 2 fb below umbilicus  Extremities: no swelling or calf tenderness                             11.6   15.87 )-----------( 197      ( 20 Dec 2023 12:00 )             34.5

## 2023-12-22 NOTE — PROGRESS NOTE ADULT - ASSESSMENT
37 yo  with PMHx of inappropriate sinus tachycardia s/p  delivery .    Assessment  1. Inappropriate sinus tachycardia  TTE  normal BiV function w/o valvular heart disease    Plan  1. Will not start any BB or ivabradine as patient has been consistently in HR 70-80s post delivery  2. Follow up with Dr. Dimas as outpatient  3. Can be taken off telemetry     During non face-to-face time, I reviewed relevant portions of the patient’s medical record. During face-to-face time, I took a relevant history and examined the patient. I also explained differential diagnoses, relevant cardiac diagnoses, workup, and management plan, which required a moderate level of medical decision making. I answered all questions related to the patient's medical conditions.     Thank you for the consult    Bymax Quan M.D.  CARDIOLOGY ATTENDING    
A/P: 38y s/p  section, POD#2, stable  #Sinus Tachycardia  - HR wnl, asymptomatic  - toprol 25  - F/u with Dr. Dimas    #gHTN  - normotensive  - denying toxic symptoms    #PP Care  -  Pain: PO motrin q6hrs, tylenol q8hrs, oxycodone for severe pain PRN  -  Post-operatively labs: hemodynamically stable, no symptoms of anemia   -  GI: tolerating regular diet, passing gas  -  : s/p plummer , urinating without difficulty  -  DVT prophylaxis: encouraged increased ambulation, SCDs, SQL  -  Dispo: POD 3 or 4
A/P: 38y s/p  section, POD#2, stable  #Sinus Tachycardia  - HR wnl, asymptomatic  - toprol 25  - F/u with Dr. Dimas    #PP Care  -  Pain: PO motrin q6hrs, tylenol q8hrs, oxycodone for severe pain PRN  -  Post-operatively labs: hemodynamically stable, no symptoms of anemia   -  GI: tolerating regular diet, passing gas  -  : s/p plummer , urinating without difficulty  -  DVT prophylaxis: encouraged increased ambulation, SCDs, SQL  -  Dispo: POD 3 or 4
37 yo  with PMHx of inappropriate sinus tachycardia s/p  delivery .    Assessment  1. Inappropriate sinus tachycardia  TTE  normal BiV function w/o valvular heart disease    Plan  1. Was tachycardiac at rest overnight, now started on metoprolol succinate 25mg PO QD, okay to use while breastfeeding, should rediscuss whether to continue at her next f/u with Dr. Dimas (please arrange new f/u as she cannot make the appt today)  2. Okay for discharge    During non face-to-face time, I reviewed relevant portions of the patient’s medical record. During face-to-face time, I took a relevant history and examined the patient. I also explained differential diagnoses, relevant cardiac diagnoses, workup, and management plan, which required a moderate level of medical decision making. I answered all questions related to the patient's medical conditions.     Thank you for the consult    Bymax Quan M.D.  CARDIOLOGY ATTENDING    
38y Female POD#1  s/p C/S    #Tachycardia  - Telemetry per cardiology  - Toprol 25mg  - outpatient follow up with Dr Dimas                                  #PP Care  - Neuro/Pain:  toradol atc, tylenol atc, oxy prn  - CV:  VS per routine, AM CBC pending  - Pulm: Encourage ISS & Ambulation  - GI: Advance as tolerated  - : Reece in place, to be removed this morning, TOV this afternoon  - DVT ppx: SCDs, Lovenox 40mg QD  - Dispo: POD #2/3

## 2023-12-27 DIAGNOSIS — O36.5930 MATERNAL CARE FOR OTHER KNOWN OR SUSPECTED POOR FETAL GROWTH, THIRD TRIMESTER, NOT APPLICABLE OR UNSPECIFIED: ICD-10-CM

## 2023-12-27 DIAGNOSIS — D25.2 SUBSEROSAL LEIOMYOMA OF UTERUS: ICD-10-CM

## 2023-12-27 DIAGNOSIS — Z3A.37 37 WEEKS GESTATION OF PREGNANCY: ICD-10-CM

## 2023-12-27 DIAGNOSIS — I47.11 INAPPROPRIATE SINUS TACHYCARDIA, SO STATED: ICD-10-CM

## 2023-12-27 DIAGNOSIS — O34.13 MATERNAL CARE FOR BENIGN TUMOR OF CORPUS UTERI, THIRD TRIMESTER: ICD-10-CM

## 2023-12-27 DIAGNOSIS — O61.9 FAILED INDUCTION OF LABOR, UNSPECIFIED: ICD-10-CM

## 2023-12-27 DIAGNOSIS — Z28.09 IMMUNIZATION NOT CARRIED OUT BECAUSE OF OTHER CONTRAINDICATION: ICD-10-CM

## 2024-01-04 ENCOUNTER — APPOINTMENT (OUTPATIENT)
Dept: OBGYN | Facility: CLINIC | Age: 39
End: 2024-01-04
Payer: MEDICAID

## 2024-01-04 VITALS — DIASTOLIC BLOOD PRESSURE: 80 MMHG | SYSTOLIC BLOOD PRESSURE: 130 MMHG

## 2024-01-04 DIAGNOSIS — O09.512 SUPERVISION OF ELDERLY PRIMIGRAVIDA, SECOND TRIMESTER: ICD-10-CM

## 2024-01-04 DIAGNOSIS — Z34.90 ENCOUNTER FOR SUPERVISION OF NORMAL PREGNANCY, UNSPECIFIED, UNSPECIFIED TRIMESTER: ICD-10-CM

## 2024-01-04 LAB
SURGICAL PATHOLOGY STUDY: SIGNIFICANT CHANGE UP
SURGICAL PATHOLOGY STUDY: SIGNIFICANT CHANGE UP

## 2024-01-04 PROCEDURE — 0503F POSTPARTUM CARE VISIT: CPT

## 2024-01-04 NOTE — HISTORY OF PRESENT ILLNESS
[Postpartum Follow Up] : postpartum follow up [Last Pap Date: ___] : Last Pap Date: [unfilled] [Delivery Date: ___] : on [unfilled] [Female] : Delivery History: baby girl [Breastfeeding] : currently nursing [Primary C/S] : delivered by  section [Wt. ___] : weighing [unfilled] [BTL] : no tubal ligation [BF with Difficulty] : nursing without difficulty [Resumed Menses] : has not resumed her menses [Resumed [place]] : has not resumed intercourse [Intended Contraception] : the patient does not intended to use contraception postpartum [None] : No associated symptoms are reported [Clean/Dry/Intact] : clean, dry and intact [Erythema] : not erythematous [Swelling] : not swollen [Dehiscence] : not dehisced [Doing Well] : is doing well [No Sign of Infection] : is showing no signs of infection [Excellent Pain Control] : has excellent pain control [Sutures Removed] : sutures were not removed [Staples Removed] : staples were removed [de-identified] : failed induction at 4cm, induction x 2 days [de-identified] : steri strip removed, area of unapposed skin on right side of incision where dissolvable staple is - staple cut and tucked underneath incision line, steri strip placed [de-identified] : Marley is doing very well, good mood and good support at home from [facility] and hers sister. She is very happy with baby girl who is gaining weight appropriately. Still taking toprol 50XL as directed by cards before discharge and has appt w/ Dr. Judie izaguirre'd 3/21. [de-identified] : Advised to continue PNVs given BF still and toprol. ED precautions for PP/post-C/S and cardiac concerns reviewed in detail. RTO in 4wks for PPV.

## 2024-01-25 ENCOUNTER — NON-APPOINTMENT (OUTPATIENT)
Age: 39
End: 2024-01-25

## 2024-01-29 ENCOUNTER — APPOINTMENT (OUTPATIENT)
Dept: OBGYN | Facility: CLINIC | Age: 39
End: 2024-01-29
Payer: MEDICAID

## 2024-01-29 VITALS — SYSTOLIC BLOOD PRESSURE: 130 MMHG | BODY MASS INDEX: 23.13 KG/M2 | DIASTOLIC BLOOD PRESSURE: 90 MMHG | WEIGHT: 139 LBS

## 2024-01-29 PROCEDURE — 0503F POSTPARTUM CARE VISIT: CPT

## 2024-01-29 NOTE — HISTORY OF PRESENT ILLNESS
[Last Pap Date: ___] : Last Pap Date: [unfilled] [Delivery Date: ___] : on [unfilled] [Female] : Delivery History: baby girl [Breastfeeding] : currently nursing [Postpartum Follow Up] : postpartum follow up [Primary C/S] : delivered by  section [BF with Difficulty] : nursing without difficulty [Resumed Menses] : has not resumed her menses [Resumed [place]] : has resumed intercourse [Intended Contraception] : Intended Contraception: [FreeTextEntry1] : Pt reports she is feeling well since delivery. EPDS score today is 1. Pt reports she is bonding well with the baby. She is doing combination breastfeeding and formula feeding. She denies SI/HI. She has not had a period again since delivery. She reports occasional dark discharge but no active bleeding. No abdominal pain. She endorses she did have one episode of intercourse during which her partner pulled out.   Physical Exam: The surgical incision site(s) was clean, dry and intact, not erythematous, not swollen and not dehisced.  A/P: Pt is recovering well at home has good mood, bonding well and has support from her mother and her boyfriend.  Pt heavily counseled on birth control options and counseled that a short interval pregnancy in the setting of her c/s would put her next pregnancy at risk. pt endorsed her understanding and interest in having Nexplanon. pt provided contact info for [facility] and will reach out ASAP and use Plan B or condoms until then, understanding plan B is NOT a reliable method or contraception. On physical exam, pt c/s scar is healing well, no erythema, induration, tenderness noted Pt to continue taking pnv Pt can resume normal activity after she is 8 weeks post-op Pt to f/u with Dr. Dimas in March for her sinus tachycardia. Pt continues to take toprolol as directed by cards prior to discharge from [facility] (appt with Judie on 3/21) Pt states she is taking her BPs at home twice a day and she is normotensive, no toxic complaints  Che [place] PGY2 Patient seen and evaluated with Dr. Soler

## 2024-01-31 ENCOUNTER — APPOINTMENT (OUTPATIENT)
Dept: OBGYN | Facility: CLINIC | Age: 39
End: 2024-01-31

## 2024-04-18 ENCOUNTER — APPOINTMENT (OUTPATIENT)
Dept: HEART AND VASCULAR | Facility: CLINIC | Age: 39
End: 2024-04-18

## 2024-10-08 ENCOUNTER — NON-APPOINTMENT (OUTPATIENT)
Age: 39
End: 2024-10-08

## 2024-10-08 ENCOUNTER — APPOINTMENT (OUTPATIENT)
Dept: HEART AND VASCULAR | Facility: CLINIC | Age: 39
End: 2024-10-08
Payer: MEDICAID

## 2024-10-08 VITALS
HEART RATE: 105 BPM | HEIGHT: 65 IN | WEIGHT: 138 LBS | BODY MASS INDEX: 22.99 KG/M2 | DIASTOLIC BLOOD PRESSURE: 86 MMHG | SYSTOLIC BLOOD PRESSURE: 132 MMHG | OXYGEN SATURATION: 98 %

## 2024-10-08 PROCEDURE — 93225 XTRNL ECG REC<48 HRS REC: CPT

## 2024-10-08 PROCEDURE — 99213 OFFICE O/P EST LOW 20 MIN: CPT

## 2024-10-08 PROCEDURE — G2211 COMPLEX E/M VISIT ADD ON: CPT | Mod: NC

## 2024-10-08 RX ORDER — MULTIVITAMIN/IRON/FOLIC ACID 18MG-0.4MG
TABLET ORAL
Refills: 0 | Status: ACTIVE | COMMUNITY

## 2024-10-24 PROCEDURE — 93227 XTRNL ECG REC<48 HR R&I: CPT
